# Patient Record
Sex: MALE | Race: WHITE | NOT HISPANIC OR LATINO
[De-identification: names, ages, dates, MRNs, and addresses within clinical notes are randomized per-mention and may not be internally consistent; named-entity substitution may affect disease eponyms.]

---

## 2018-07-27 PROBLEM — Z00.00 ENCOUNTER FOR PREVENTIVE HEALTH EXAMINATION: Status: ACTIVE | Noted: 2018-07-27

## 2018-08-09 ENCOUNTER — APPOINTMENT (OUTPATIENT)
Dept: VASCULAR SURGERY | Facility: CLINIC | Age: 73
End: 2018-08-09

## 2019-07-18 ENCOUNTER — INPATIENT (INPATIENT)
Facility: HOSPITAL | Age: 74
LOS: 4 days | Discharge: HOME | End: 2019-07-23
Attending: INTERNAL MEDICINE | Admitting: INTERNAL MEDICINE
Payer: MEDICARE

## 2019-07-18 VITALS
DIASTOLIC BLOOD PRESSURE: 75 MMHG | HEART RATE: 114 BPM | TEMPERATURE: 96 F | WEIGHT: 210.1 LBS | RESPIRATION RATE: 20 BRPM | OXYGEN SATURATION: 97 % | SYSTOLIC BLOOD PRESSURE: 152 MMHG

## 2019-07-18 LAB
ALBUMIN SERPL ELPH-MCNC: 3.9 G/DL — SIGNIFICANT CHANGE UP (ref 3.5–5.2)
ALP SERPL-CCNC: 83 U/L — SIGNIFICANT CHANGE UP (ref 30–115)
ALT FLD-CCNC: 12 U/L — SIGNIFICANT CHANGE UP (ref 0–41)
ANION GAP SERPL CALC-SCNC: 15 MMOL/L — HIGH (ref 7–14)
AST SERPL-CCNC: 28 U/L — SIGNIFICANT CHANGE UP (ref 0–41)
BASOPHILS # BLD AUTO: 0.06 K/UL — SIGNIFICANT CHANGE UP (ref 0–0.2)
BASOPHILS NFR BLD AUTO: 0.5 % — SIGNIFICANT CHANGE UP (ref 0–1)
BILIRUB SERPL-MCNC: 0.9 MG/DL — SIGNIFICANT CHANGE UP (ref 0.2–1.2)
BUN SERPL-MCNC: 15 MG/DL — SIGNIFICANT CHANGE UP (ref 10–20)
CALCIUM SERPL-MCNC: 8.8 MG/DL — SIGNIFICANT CHANGE UP (ref 8.5–10.1)
CHLORIDE SERPL-SCNC: 104 MMOL/L — SIGNIFICANT CHANGE UP (ref 98–110)
CO2 SERPL-SCNC: 21 MMOL/L — SIGNIFICANT CHANGE UP (ref 17–32)
CREAT SERPL-MCNC: 1 MG/DL — SIGNIFICANT CHANGE UP (ref 0.7–1.5)
EOSINOPHIL # BLD AUTO: 0.37 K/UL — SIGNIFICANT CHANGE UP (ref 0–0.7)
EOSINOPHIL NFR BLD AUTO: 3.2 % — SIGNIFICANT CHANGE UP (ref 0–8)
GLUCOSE SERPL-MCNC: 145 MG/DL — HIGH (ref 70–99)
HCT VFR BLD CALC: 42.8 % — SIGNIFICANT CHANGE UP (ref 42–52)
HGB BLD-MCNC: 15.2 G/DL — SIGNIFICANT CHANGE UP (ref 14–18)
IMM GRANULOCYTES NFR BLD AUTO: 0.3 % — SIGNIFICANT CHANGE UP (ref 0.1–0.3)
INR BLD: 1.22 RATIO — SIGNIFICANT CHANGE UP (ref 0.65–1.3)
LYMPHOCYTES # BLD AUTO: 4.6 K/UL — HIGH (ref 1.2–3.4)
LYMPHOCYTES # BLD AUTO: 40.1 % — SIGNIFICANT CHANGE UP (ref 20.5–51.1)
MCHC RBC-ENTMCNC: 33.3 PG — HIGH (ref 27–31)
MCHC RBC-ENTMCNC: 35.5 G/DL — SIGNIFICANT CHANGE UP (ref 32–37)
MCV RBC AUTO: 93.7 FL — SIGNIFICANT CHANGE UP (ref 80–94)
MONOCYTES # BLD AUTO: 1.02 K/UL — HIGH (ref 0.1–0.6)
MONOCYTES NFR BLD AUTO: 8.9 % — SIGNIFICANT CHANGE UP (ref 1.7–9.3)
NEUTROPHILS # BLD AUTO: 5.4 K/UL — SIGNIFICANT CHANGE UP (ref 1.4–6.5)
NEUTROPHILS NFR BLD AUTO: 47 % — SIGNIFICANT CHANGE UP (ref 42.2–75.2)
NRBC # BLD: 0 /100 WBCS — SIGNIFICANT CHANGE UP (ref 0–0)
PLATELET # BLD AUTO: 248 K/UL — SIGNIFICANT CHANGE UP (ref 130–400)
POTASSIUM SERPL-MCNC: 4.1 MMOL/L — SIGNIFICANT CHANGE UP (ref 3.5–5)
POTASSIUM SERPL-SCNC: 4.1 MMOL/L — SIGNIFICANT CHANGE UP (ref 3.5–5)
PROT SERPL-MCNC: 7 G/DL — SIGNIFICANT CHANGE UP (ref 6–8)
PROTHROM AB SERPL-ACNC: 14 SEC — HIGH (ref 9.95–12.87)
RBC # BLD: 4.57 M/UL — LOW (ref 4.7–6.1)
RBC # FLD: 12.6 % — SIGNIFICANT CHANGE UP (ref 11.5–14.5)
SODIUM SERPL-SCNC: 140 MMOL/L — SIGNIFICANT CHANGE UP (ref 135–146)
TROPONIN T SERPL-MCNC: <0.01 NG/ML — SIGNIFICANT CHANGE UP
WBC # BLD: 11.48 K/UL — HIGH (ref 4.8–10.8)
WBC # FLD AUTO: 11.48 K/UL — HIGH (ref 4.8–10.8)

## 2019-07-18 PROCEDURE — 99222 1ST HOSP IP/OBS MODERATE 55: CPT

## 2019-07-18 PROCEDURE — 70498 CT ANGIOGRAPHY NECK: CPT | Mod: 26

## 2019-07-18 PROCEDURE — 70450 CT HEAD/BRAIN W/O DYE: CPT | Mod: 26,59

## 2019-07-18 PROCEDURE — 71045 X-RAY EXAM CHEST 1 VIEW: CPT | Mod: 26

## 2019-07-18 PROCEDURE — 99291 CRITICAL CARE FIRST HOUR: CPT

## 2019-07-18 PROCEDURE — 70496 CT ANGIOGRAPHY HEAD: CPT | Mod: 26

## 2019-07-18 RX ORDER — DILTIAZEM HCL 120 MG
10 CAPSULE, EXT RELEASE 24 HR ORAL ONCE
Refills: 0 | Status: COMPLETED | OUTPATIENT
Start: 2019-07-18 | End: 2019-07-18

## 2019-07-18 RX ORDER — SODIUM CHLORIDE 9 MG/ML
1000 INJECTION, SOLUTION INTRAVENOUS
Refills: 0 | Status: DISCONTINUED | OUTPATIENT
Start: 2019-07-18 | End: 2019-07-21

## 2019-07-18 RX ORDER — PANTOPRAZOLE SODIUM 20 MG/1
40 TABLET, DELAYED RELEASE ORAL
Refills: 0 | Status: DISCONTINUED | OUTPATIENT
Start: 2019-07-18 | End: 2019-07-23

## 2019-07-18 RX ORDER — CHLORHEXIDINE GLUCONATE 213 G/1000ML
1 SOLUTION TOPICAL
Refills: 0 | Status: DISCONTINUED | OUTPATIENT
Start: 2019-07-18 | End: 2019-07-23

## 2019-07-18 RX ORDER — SODIUM CHLORIDE 9 MG/ML
1000 INJECTION, SOLUTION INTRAVENOUS ONCE
Refills: 0 | Status: COMPLETED | OUTPATIENT
Start: 2019-07-18 | End: 2019-07-18

## 2019-07-18 RX ORDER — SODIUM CHLORIDE 9 MG/ML
1000 INJECTION INTRAMUSCULAR; INTRAVENOUS; SUBCUTANEOUS ONCE
Refills: 0 | Status: COMPLETED | OUTPATIENT
Start: 2019-07-18 | End: 2019-07-18

## 2019-07-18 RX ORDER — ENOXAPARIN SODIUM 100 MG/ML
40 INJECTION SUBCUTANEOUS DAILY
Refills: 0 | Status: DISCONTINUED | OUTPATIENT
Start: 2019-07-18 | End: 2019-07-19

## 2019-07-18 RX ORDER — METOPROLOL TARTRATE 50 MG
50 TABLET ORAL DAILY
Refills: 0 | Status: DISCONTINUED | OUTPATIENT
Start: 2019-07-18 | End: 2019-07-23

## 2019-07-18 RX ORDER — ASPIRIN/CALCIUM CARB/MAGNESIUM 324 MG
81 TABLET ORAL DAILY
Refills: 0 | Status: DISCONTINUED | OUTPATIENT
Start: 2019-07-18 | End: 2019-07-23

## 2019-07-18 RX ORDER — DILTIAZEM HCL 120 MG
5 CAPSULE, EXT RELEASE 24 HR ORAL
Qty: 125 | Refills: 0 | Status: DISCONTINUED | OUTPATIENT
Start: 2019-07-18 | End: 2019-07-19

## 2019-07-18 RX ORDER — ATORVASTATIN CALCIUM 80 MG/1
80 TABLET, FILM COATED ORAL AT BEDTIME
Refills: 0 | Status: DISCONTINUED | OUTPATIENT
Start: 2019-07-18 | End: 2019-07-23

## 2019-07-18 RX ORDER — METOPROLOL TARTRATE 50 MG
5 TABLET ORAL ONCE
Refills: 0 | Status: COMPLETED | OUTPATIENT
Start: 2019-07-18 | End: 2019-07-18

## 2019-07-18 RX ADMIN — SODIUM CHLORIDE 1000 MILLILITER(S): 9 INJECTION, SOLUTION INTRAVENOUS at 18:30

## 2019-07-18 RX ADMIN — Medication 50 MILLIGRAM(S): at 19:18

## 2019-07-18 RX ADMIN — Medication 5 MILLIGRAM(S): at 20:54

## 2019-07-18 RX ADMIN — Medication 10 MILLIGRAM(S): at 16:30

## 2019-07-18 RX ADMIN — SODIUM CHLORIDE 1000 MILLILITER(S): 9 INJECTION INTRAMUSCULAR; INTRAVENOUS; SUBCUTANEOUS at 16:30

## 2019-07-18 RX ADMIN — SODIUM CHLORIDE 1000 MILLILITER(S): 9 INJECTION, SOLUTION INTRAVENOUS at 16:55

## 2019-07-18 RX ADMIN — SODIUM CHLORIDE 33.33 MILLILITER(S): 9 INJECTION INTRAMUSCULAR; INTRAVENOUS; SUBCUTANEOUS at 23:02

## 2019-07-18 RX ADMIN — SODIUM CHLORIDE 1000 MILLILITER(S): 9 INJECTION, SOLUTION INTRAVENOUS at 19:18

## 2019-07-18 RX ADMIN — ENOXAPARIN SODIUM 40 MILLIGRAM(S): 100 INJECTION SUBCUTANEOUS at 23:12

## 2019-07-18 RX ADMIN — Medication 5 MG/HR: at 23:58

## 2019-07-18 RX ADMIN — ATORVASTATIN CALCIUM 80 MILLIGRAM(S): 80 TABLET, FILM COATED ORAL at 23:10

## 2019-07-18 RX ADMIN — SODIUM CHLORIDE 1000 MILLILITER(S): 9 INJECTION, SOLUTION INTRAVENOUS at 16:00

## 2019-07-18 RX ADMIN — Medication 81 MILLIGRAM(S): at 23:12

## 2019-07-18 RX ADMIN — Medication 10 MILLIGRAM(S): at 16:00

## 2019-07-18 RX ADMIN — SODIUM CHLORIDE 75 MILLILITER(S): 9 INJECTION, SOLUTION INTRAVENOUS at 20:45

## 2019-07-18 NOTE — H&P ADULT - ATTENDING COMMENTS
72yo male presented to ER apparently with weakness but was found to have rapid atrial fibrillation. Was admitted to our CCU (closed unit at night) for further evaluation and management

## 2019-07-18 NOTE — ED PROVIDER NOTE - CLINICAL SUMMARY MEDICAL DECISION MAKING FREE TEXT BOX
Pt with sudden onset dizziness, was slurring words per family.  Pt without focal deficit, generalized weakness, so stroke code activated.  Pt however found with rapid HR, irregular. Case d/w Neuro, Not tpa candidate  Treated with Cardizem with HR 80s to 120s, however had drop in BP.  IVFs started.  ICU consulted and patient admitted

## 2019-07-18 NOTE — H&P ADULT - ASSESSMENT
A/P     1. weakness / ? slurred speech   - ct head negative for cva  - neuro fu   - neuro checks q 4 hrs   - 2 d echo   - trops   - bnp   - cardiology eval in am   - pan cx   - tsh   - dvt ppx

## 2019-07-18 NOTE — ED PROVIDER NOTE - ATTENDING CONTRIBUTION TO CARE
74 yo M PMHx HTN on Metoprolol, h/o "stenosis", superficial blood clot to LE 1 yr ago presents via EMS with family from home with c/o dizziness that began suddenly at 2 30 pm.  Pt was in usual state of health prior to that.  Daughter found pt to be unsteady and thought that his speech was slurred.  Pt c/o dry mouth, feels SOB.  On exam pt AAO x 3, speech is clear, but slow and soft, PERRL, + nystagmus, face symmetrical. Ext atraumatic, no edema, no drift, generalized weakness, + mur, irregular, Lungs CAT B/L no wrr, no carotid bruits,

## 2019-07-18 NOTE — ED PROVIDER NOTE - PHYSICAL EXAMINATION
GEN: Alert & Oriented x 3, No acute distress. Calm, appropriate.  Head and Neck: Normocephalic, atraumatic.   ENT: Oral mucosa pink, dry without lesions.   Eyes: PERRL. EOMI. horizontal nystagmus. No conjunctival injection. No scleral icterus.   RESP: Lungs clear to auscult bilat. no wheezes, rhonchi or rales. No retractions. Equal air entry.  CARDIO: irregularly irregular rate and rhythm, no murmurs, rubs or gallops. Normal S1, S2. Radial pulses 2+ bilaterally. No lower extremity edema.  ABD: Soft, Nondistended.  No rebound tenderness/guarding. No pulsatile mass. No tenderness with palpation x 4 quadrants.   MS: Full ROM of extremities.   SKIN: no rashes/lesions, no petechiae, no ecchymosis.  Neuro: A&O x3, CN II- XII intact, strength 5/5 throughout extremities, sensation intact. Speech & mentation intact. No drooping of eye or mouth. Without dysarthria or aphasia. Finger to nose intact. Romberg Neg. Neg. nuchal rigidity. nih=0.

## 2019-07-18 NOTE — ED PROVIDER NOTE - CRITICAL CARE PROVIDED
interpretation of diagnostic studies/direct patient care (not related to procedure)/consultation with other physicians

## 2019-07-18 NOTE — H&P ADULT - HISTORY OF PRESENT ILLNESS
73 year old man with HTN was usual state this am , when he felt week , sluggish with ? slurred speech. Pt was given cardizem, and now hypotension.

## 2019-07-18 NOTE — ED PROVIDER NOTE - NS ED ROS FT
GEN: (-) fever, (-) chills, (-) malaise  HEENT: (-) vision changes, (-) HA, (-) sore throat, (-) ear pain, (-) tinnitus   CV: (-) chest pain, (-) palpitations, (+) SOB  PULM: (-) cough, (-) wheezing, (-) dyspnea  GI: (-) abdominal pain,(-) Nausea, (-) Vomiting, (-) Diarrhea, (-) Melena  NEURO: (+) Dizziness, (+) weakness, (-) paresthesias, (-) syncope  : (-) dysuria, (-) frequency, (-) urgency  MS: (-) back pain, (-) joint pain, (-)myalgias, (-) swelling  SKIN: (-) rashes, (-) new lesions  HEME: (-) bleeding, (-) ecchymosis

## 2019-07-18 NOTE — H&P ADULT - NSHPPHYSICALEXAM_GEN_ALL_CORE
PE vss  general: awake  chest: cta b/l  cvs: s1s2+, + murmur   abd: soft, nt, nd, bs+  ext: no edema

## 2019-07-18 NOTE — ED ADULT NURSE NOTE - CHPI ED NUR SYMPTOMS NEG
no back pain/no chest pain/no shortness of breath/no chills/no vomiting/no syncope/no diaphoresis/no nausea/no congestion/no fever

## 2019-07-18 NOTE — ED PROVIDER NOTE - OBJECTIVE STATEMENT
The pt is a 73y Male with PMH HTN is presenting to ED with dizziness x 1 day. Pt sta The pt is a 73y Male with PMH HTN is presenting to ED with dizziness x 1 day. Pt states he got acutely dizzy while sitting in his recliner around 230 this afternoon. associated with feeling off balance, weak and short of breath and dry mouth. Pt states he called his daughter and his daughter states she thought he was slurring his words a bit. It has since resolved. Pt denies f/c/n/v/d, decreased urinary output, dysuria, urinary urgency/frequency, cp, palpitations, syncope, cough, abd pain.

## 2019-07-18 NOTE — PATIENT PROFILE ADULT - STATED REASON FOR ADMISSION
pt states he was sitting and watching TV, and when he stood up, he got very dizzy, palpitations pt states he was sitting and watching TV, and when he stood up, he got very dizzy & had palpitations

## 2019-07-19 LAB
ALBUMIN SERPL ELPH-MCNC: 2.9 G/DL — LOW (ref 3.5–5.2)
ALP SERPL-CCNC: 65 U/L — SIGNIFICANT CHANGE UP (ref 30–115)
ALT FLD-CCNC: 9 U/L — SIGNIFICANT CHANGE UP (ref 0–41)
ANION GAP SERPL CALC-SCNC: 10 MMOL/L — SIGNIFICANT CHANGE UP (ref 7–14)
APPEARANCE UR: CLEAR — SIGNIFICANT CHANGE UP
AST SERPL-CCNC: 20 U/L — SIGNIFICANT CHANGE UP (ref 0–41)
BILIRUB SERPL-MCNC: 0.5 MG/DL — SIGNIFICANT CHANGE UP (ref 0.2–1.2)
BILIRUB UR-MCNC: NEGATIVE — SIGNIFICANT CHANGE UP
BUN SERPL-MCNC: 14 MG/DL — SIGNIFICANT CHANGE UP (ref 10–20)
CALCIUM SERPL-MCNC: 7.8 MG/DL — LOW (ref 8.5–10.1)
CHLORIDE SERPL-SCNC: 107 MMOL/L — SIGNIFICANT CHANGE UP (ref 98–110)
CK MB CFR SERPL CALC: 11.8 NG/ML — HIGH (ref 0.6–6.3)
CK MB CFR SERPL CALC: 9.4 NG/ML — HIGH (ref 0.6–6.3)
CK SERPL-CCNC: 110 U/L — SIGNIFICANT CHANGE UP (ref 0–225)
CK SERPL-CCNC: 134 U/L — SIGNIFICANT CHANGE UP (ref 0–225)
CO2 SERPL-SCNC: 22 MMOL/L — SIGNIFICANT CHANGE UP (ref 17–32)
COLOR SPEC: YELLOW — SIGNIFICANT CHANGE UP
CREAT SERPL-MCNC: 0.8 MG/DL — SIGNIFICANT CHANGE UP (ref 0.7–1.5)
DIFF PNL FLD: NEGATIVE — SIGNIFICANT CHANGE UP
ESTIMATED AVERAGE GLUCOSE: 97 MG/DL — SIGNIFICANT CHANGE UP (ref 68–114)
GLUCOSE BLDC GLUCOMTR-MCNC: 96 MG/DL — SIGNIFICANT CHANGE UP (ref 70–99)
GLUCOSE SERPL-MCNC: 103 MG/DL — HIGH (ref 70–99)
GLUCOSE UR QL: NEGATIVE MG/DL — SIGNIFICANT CHANGE UP
HBA1C BLD-MCNC: 5 % — SIGNIFICANT CHANGE UP (ref 4–5.6)
HCT VFR BLD CALC: 35.2 % — LOW (ref 42–52)
HGB BLD-MCNC: 12.4 G/DL — LOW (ref 14–18)
KETONES UR-MCNC: NEGATIVE — SIGNIFICANT CHANGE UP
LEUKOCYTE ESTERASE UR-ACNC: NEGATIVE — SIGNIFICANT CHANGE UP
MCHC RBC-ENTMCNC: 33.9 PG — HIGH (ref 27–31)
MCHC RBC-ENTMCNC: 35.2 G/DL — SIGNIFICANT CHANGE UP (ref 32–37)
MCV RBC AUTO: 96.2 FL — HIGH (ref 80–94)
NITRITE UR-MCNC: NEGATIVE — SIGNIFICANT CHANGE UP
NRBC # BLD: 0 /100 WBCS — SIGNIFICANT CHANGE UP (ref 0–0)
NT-PROBNP SERPL-SCNC: 1619 PG/ML — HIGH (ref 0–300)
PH UR: 5.5 — SIGNIFICANT CHANGE UP (ref 5–8)
PLATELET # BLD AUTO: 178 K/UL — SIGNIFICANT CHANGE UP (ref 130–400)
POTASSIUM SERPL-MCNC: 3.7 MMOL/L — SIGNIFICANT CHANGE UP (ref 3.5–5)
POTASSIUM SERPL-SCNC: 3.7 MMOL/L — SIGNIFICANT CHANGE UP (ref 3.5–5)
PROT SERPL-MCNC: 5.4 G/DL — LOW (ref 6–8)
PROT UR-MCNC: 30 MG/DL
RBC # BLD: 3.66 M/UL — LOW (ref 4.7–6.1)
RBC # FLD: 12.9 % — SIGNIFICANT CHANGE UP (ref 11.5–14.5)
SODIUM SERPL-SCNC: 139 MMOL/L — SIGNIFICANT CHANGE UP (ref 135–146)
SP GR SPEC: 1.01 — SIGNIFICANT CHANGE UP (ref 1.01–1.03)
TROPONIN T SERPL-MCNC: 0.1 NG/ML — CRITICAL HIGH
TSH SERPL-MCNC: 0.93 UIU/ML — SIGNIFICANT CHANGE UP (ref 0.27–4.2)
UROBILINOGEN FLD QL: 0.2 MG/DL — SIGNIFICANT CHANGE UP (ref 0.2–0.2)
WBC # BLD: 9.98 K/UL — SIGNIFICANT CHANGE UP (ref 4.8–10.8)
WBC # FLD AUTO: 9.98 K/UL — SIGNIFICANT CHANGE UP (ref 4.8–10.8)

## 2019-07-19 PROCEDURE — 71045 X-RAY EXAM CHEST 1 VIEW: CPT | Mod: 26

## 2019-07-19 PROCEDURE — 99222 1ST HOSP IP/OBS MODERATE 55: CPT

## 2019-07-19 PROCEDURE — 99233 SBSQ HOSP IP/OBS HIGH 50: CPT

## 2019-07-19 PROCEDURE — 93880 EXTRACRANIAL BILAT STUDY: CPT | Mod: 26

## 2019-07-19 RX ORDER — RIVAROXABAN 15 MG-20MG
20 KIT ORAL
Refills: 0 | Status: DISCONTINUED | OUTPATIENT
Start: 2019-07-19 | End: 2019-07-22

## 2019-07-19 RX ORDER — AMIODARONE HYDROCHLORIDE 400 MG/1
200 TABLET ORAL
Refills: 0 | Status: DISCONTINUED | OUTPATIENT
Start: 2019-07-19 | End: 2019-07-22

## 2019-07-19 RX ORDER — ENOXAPARIN SODIUM 100 MG/ML
100 INJECTION SUBCUTANEOUS
Refills: 0 | Status: DISCONTINUED | OUTPATIENT
Start: 2019-07-19 | End: 2019-07-19

## 2019-07-19 RX ADMIN — ATORVASTATIN CALCIUM 80 MILLIGRAM(S): 80 TABLET, FILM COATED ORAL at 21:39

## 2019-07-19 RX ADMIN — AMIODARONE HYDROCHLORIDE 200 MILLIGRAM(S): 400 TABLET ORAL at 21:39

## 2019-07-19 RX ADMIN — CHLORHEXIDINE GLUCONATE 1 APPLICATION(S): 213 SOLUTION TOPICAL at 17:27

## 2019-07-19 RX ADMIN — SODIUM CHLORIDE 75 MILLILITER(S): 9 INJECTION, SOLUTION INTRAVENOUS at 07:59

## 2019-07-19 RX ADMIN — PANTOPRAZOLE SODIUM 40 MILLIGRAM(S): 20 TABLET, DELAYED RELEASE ORAL at 06:10

## 2019-07-19 RX ADMIN — AMIODARONE HYDROCHLORIDE 200 MILLIGRAM(S): 400 TABLET ORAL at 10:31

## 2019-07-19 RX ADMIN — CHLORHEXIDINE GLUCONATE 1 APPLICATION(S): 213 SOLUTION TOPICAL at 10:09

## 2019-07-19 RX ADMIN — SODIUM CHLORIDE 75 MILLILITER(S): 9 INJECTION, SOLUTION INTRAVENOUS at 20:00

## 2019-07-19 RX ADMIN — RIVAROXABAN 20 MILLIGRAM(S): KIT at 17:27

## 2019-07-19 RX ADMIN — Medication 81 MILLIGRAM(S): at 11:13

## 2019-07-19 NOTE — CONSULT NOTE ADULT - SUBJECTIVE AND OBJECTIVE BOX
CARDIOLOGY CONSULT NOTE     CHIEF COMPLAINT/REASON FOR CONSULT:    HPI:  73 year old man with HTN was usual state this am , when he felt week , sluggish with ? slurred speech. Pt was given cardizem, and now hypotension. (18 Jul 2019 18:10)      PAST MEDICAL & SURGICAL HISTORY:  No pertinent past medical history  No significant past surgical history      Cardiac Risks:   [x ]HTN, [ ] DM, [ ] Smoking, [ ] FH,  [ ] Lipids        MEDICATIONS:  MEDICATIONS  (STANDING):  aspirin  chewable 81 milliGRAM(s) Oral daily  atorvastatin 80 milliGRAM(s) Oral at bedtime  chlorhexidine 4% Liquid 1 Application(s) Topical two times a day  diltiazem Infusion 5 mG/Hr (5 mL/Hr) IV Continuous <Continuous>  enoxaparin Injectable 40 milliGRAM(s) SubCutaneous daily  lactated ringers. 1000 milliLiter(s) (75 mL/Hr) IV Continuous <Continuous>  metoprolol succinate ER 50 milliGRAM(s) Oral daily  pantoprazole    Tablet 40 milliGRAM(s) Oral before breakfast      FAMILY HISTORY:  No pertinent family history in first degree relatives      SOCIAL HISTORY:      [ ] Marital status    Allergies    No Known Allergies        	    REVIEW OF SYSTEMS:  CONSTITUTIONAL: No fever, weight loss, or fatigue  EYES: No eye pain, visual disturbances, or discharge  ENMT:  No difficulty hearing, tinnitus, vertigo; No sinus or throat pain  NECK: No pain or stiffness  RESPIRATORY: No cough, wheezing, chills or hemoptysis; No Shortness of Breath  CARDIOVASCULAR: See above  GASTROINTESTINAL: No abdominal or epigastric pain. No nausea, vomiting, or hematemesis; No diarrhea or constipation. No melena or hematochezia.  GENITOURINARY: No dysuria, frequency, hematuria, or incontinence  NEUROLOGICAL: No headaches, memory loss, loss of strength, numbness, or tremors  SKIN: No itching, burning, rashes, or lesions   	      PHYSICAL EXAM:  T(C): 36.2 (07-19-19 @ 07:15), Max: 36.6 (07-18-19 @ 18:37)  HR: 115 (07-19-19 @ 06:00) (88 - 152)  BP: 95/62 (07-19-19 @ 06:00) (74/50 - 152/75)  RR: 18 (07-19-19 @ 07:15) (11 - 21)  SpO2: 98% (07-19-19 @ 06:00) (97% - 100%)  Wt(kg): --  I&O's Summary    18 Jul 2019 07:01  -  19 Jul 2019 07:00  --------------------------------------------------------  IN: 1305 mL / OUT: 925 mL / NET: 380 mL        Appearance: Normal	  Psychiatry: A & O x 3, Mood & affect appropriate  HEENT:   Normal oral mucosa, PERRL, EOMI	  Lymphatic: No lymphadenopathy  Cardiovascular: Normal S1 S2,Irreg, No JVD, No murmurs  Respiratory: Lungs clear to auscultation	  Gastrointestinal:  Soft, Non-tender, + BS	  Skin: No rashes, No ecchymoses, No cyanosis	  Neurologic: Non-focal  Extremities: Normal range of motion, No clubbing, cyanosis or edema  Vascular: Peripheral pulses palpable 2+ bilaterally      ECG:  	not available    	  LABS:	 	    CARDIAC MARKERS:                                    12.4   9.98  )-----------( 178      ( 19 Jul 2019 05:54 )             35.2     07-18    140  |  104  |  15  ----------------------------<  145<H>  4.1   |  21  |  1.0    Ca    8.8      18 Jul 2019 16:05    TPro  7.0  /  Alb  3.9  /  TBili  0.9  /  DBili  x   /  AST  28  /  ALT  12  /  AlkPhos  83  07-18    PT/INR - ( 18 Jul 2019 17:25 )   PT: 14.00 sec;   INR: 1.22 ratio         PTT - ( 18 Jul 2019 17:25 )  PTT:29.6 sec CARDIOLOGY CONSULT NOTE     CHIEF COMPLAINT/REASON FOR CONSULT:    HPI:  73 year old man with HTN was usual state this am , when he felt week , sluggish with ? slurred speech. Pt was given cardizem, and now hypotension. (18 Jul 2019 18:10)      PAST MEDICAL & SURGICAL HISTORY:  No pertinent past medical history  No significant past surgical history      Cardiac Risks:   [x ]HTN, [ ] DM, [ ] Smoking, [ ] FH,  [ ] Lipids        MEDICATIONS:  MEDICATIONS  (STANDING):  aspirin  chewable 81 milliGRAM(s) Oral daily  atorvastatin 80 milliGRAM(s) Oral at bedtime  chlorhexidine 4% Liquid 1 Application(s) Topical two times a day  diltiazem Infusion 5 mG/Hr (5 mL/Hr) IV Continuous <Continuous>  enoxaparin Injectable 40 milliGRAM(s) SubCutaneous daily  lactated ringers. 1000 milliLiter(s) (75 mL/Hr) IV Continuous <Continuous>  metoprolol succinate ER 50 milliGRAM(s) Oral daily  pantoprazole    Tablet 40 milliGRAM(s) Oral before breakfast      FAMILY HISTORY:  No pertinent family history in first degree relatives      SOCIAL HISTORY:      [ ] Marital status    Allergies    No Known Allergies        	    REVIEW OF SYSTEMS:  CONSTITUTIONAL: No fever, weight loss, or fatigue  EYES: No eye pain, visual disturbances, or discharge  ENMT:  No difficulty hearing, tinnitus, vertigo; No sinus or throat pain  NECK: No pain or stiffness  RESPIRATORY: No cough, wheezing, chills or hemoptysis; No Shortness of Breath  CARDIOVASCULAR: See above  GASTROINTESTINAL: No abdominal or epigastric pain. No nausea, vomiting, or hematemesis; No diarrhea or constipation. No melena or hematochezia.  GENITOURINARY: No dysuria, frequency, hematuria, or incontinence  NEUROLOGICAL: No headaches, memory loss, loss of strength, numbness, or tremors  SKIN: No itching, burning, rashes, or lesions   	      PHYSICAL EXAM:  T(C): 36.2 (07-19-19 @ 07:15), Max: 36.6 (07-18-19 @ 18:37)  HR: 115 (07-19-19 @ 06:00) (88 - 152)  BP: 95/62 (07-19-19 @ 06:00) (74/50 - 152/75)  RR: 18 (07-19-19 @ 07:15) (11 - 21)  SpO2: 98% (07-19-19 @ 06:00) (97% - 100%)  Wt(kg): --  I&O's Summary    18 Jul 2019 07:01  -  19 Jul 2019 07:00  --------------------------------------------------------  IN: 1305 mL / OUT: 925 mL / NET: 380 mL        Appearance: Normal	  Psychiatry: A & O x 3, Mood & affect appropriate  HEENT:   Normal oral mucosa, PERRL, EOMI	  Lymphatic: No lymphadenopathy  Cardiovascular: Normal S1 S2,Irreg, II /VI gene lsb No JVD, No murmurs  Respiratory: Lungs clear to auscultation	  Gastrointestinal:  Soft, Non-tender, + BS	  Skin: No rashes, No ecchymoses, No cyanosis	  Neurologic: Non-focal  Extremities: Normal range of motion, No clubbing, cyanosis or edema  Vascular: Peripheral pulses palpable 2+ bilaterally      ECG:  	not available    	  LABS:	 	    CARDIAC MARKERS:                                    12.4   9.98  )-----------( 178      ( 19 Jul 2019 05:54 )             35.2     07-18    140  |  104  |  15  ----------------------------<  145<H>  4.1   |  21  |  1.0    Ca    8.8      18 Jul 2019 16:05    TPro  7.0  /  Alb  3.9  /  TBili  0.9  /  DBili  x   /  AST  28  /  ALT  12  /  AlkPhos  83  07-18    PT/INR - ( 18 Jul 2019 17:25 )   PT: 14.00 sec;   INR: 1.22 ratio         PTT - ( 18 Jul 2019 17:25 )  PTT:29.6 sec

## 2019-07-19 NOTE — PROGRESS NOTE ADULT - SUBJECTIVE AND OBJECTIVE BOX
24H events:    Patient feeling better but still weak and fatigued    PAST MEDICAL & SURGICAL HISTORY  No pertinent past medical history  No significant past surgical history    SOCIAL HISTORY:  Negative for smoking/alcohol/drug use.     ALLERGIES:  No Known Allergies    MEDICATIONS:  STANDING MEDICATIONS  amiodarone    Tablet 200 milliGRAM(s) Oral two times a day  aspirin  chewable 81 milliGRAM(s) Oral daily  atorvastatin 80 milliGRAM(s) Oral at bedtime  chlorhexidine 4% Liquid 1 Application(s) Topical two times a day  enoxaparin Injectable 100 milliGRAM(s) SubCutaneous two times a day  lactated ringers. 1000 milliLiter(s) IV Continuous <Continuous>  metoprolol succinate ER 50 milliGRAM(s) Oral daily  pantoprazole    Tablet 40 milliGRAM(s) Oral before breakfast    PRN MEDICATIONS    VITALS:   T(F): 97.1  HR: 76  BP: 100/68  RR: 21  SpO2: 97%    LABS:                        12.4   9.98  )-----------( 178      ( 2019 05:54 )             35.2         139  |  107  |  14  ----------------------------<  103<H>  3.7   |  22  |  0.8    Ca    7.8<L>      2019 05:54    TPro  5.4<L>  /  Alb  2.9<L>  /  TBili  0.5  /  DBili  x   /  AST  20  /  ALT  9   /  AlkPhos  65      PT/INR - ( 2019 17:25 )   PT: 14.00 sec;   INR: 1.22 ratio         PTT - ( 2019 17:25 )  PTT:29.6 sec  Urinalysis Basic - ( 2019 00:05 )    Color: Yellow / Appearance: Clear / S.015 / pH: x  Gluc: x / Ketone: Negative  / Bili: Negative / Urobili: 0.2 mg/dL   Blood: x / Protein: 30 mg/dL / Nitrite: Negative   Leuk Esterase: Negative / RBC: 1-2 /HPF / WBC 6-10 /HPF   Sq Epi: x / Non Sq Epi: Occasional /HPF / Bacteria: Few        Troponin T, Serum: 0.10 ng/mL <HH> (19 @ 05:54)  Troponin T, Serum: <0.01 ng/mL (19 @ 16:05)      CARDIAC MARKERS ( 2019 05:54 )  x     / 0.10 ng/mL / x     / x     / x      CARDIAC MARKERS ( 2019 16:05 )  x     / <0.01 ng/mL / x     / x     / x          RADIOLOGY:    PHYSICAL EXAM:  GEN: No acute distress  LUNGS: Clear to auscultation bilaterally   HEART: Irregular, tachycardic (at 8am)  ABD: Soft, non-tender, non-distended.   EXT: No edema  NEURO: AAOX3, no fND

## 2019-07-19 NOTE — CONSULT NOTE ADULT - REASON FOR ADMISSION
tachycardia , hypotension, weeness, ? slurred speech
tachycardia , hypotension, weeness, ? slurred speech

## 2019-07-19 NOTE — CONSULT NOTE ADULT - ASSESSMENT
Patient with above hx. DVT in past. He was on AC in past. Now lethargic. Not clear etiology. He may have AC. Would echo check thyroid. Neuro. AC if neuro agrees. Beta. Amiodarone for rate control. Prognosis guarded

## 2019-07-19 NOTE — PROGRESS NOTE ADULT - ASSESSMENT
73 year old man with HTN, DVT now off AC presented for generalized weakness, dysarthria? and found to be in AFib with RVR    Generalized weakness: Dehydration/orthostatic/afib vs less likely infection (WBC 11.48 initially then improved, UA neg, CXR neg, afebrile) vs doubt stroke vs other  - IVF  - Check orthostatics  - F/u cultures    New onset AFib with RVR: First trop neg, 2nd 0.1 (no CP); now converted to NSR  - Amio 200mg q12  - 2d echo performed, awaiting result   - Start AC, will likely switch to DOAC  - Repeat CE  - Likely would benefit from sleep study as outpatient  - Check TSH    HTN: now borderline hypotensive  - C/w BB, DC if BP dropping  - IVF    DVT ppx: on therapeutic lovenox  Dispo: From home

## 2019-07-19 NOTE — CONSULT NOTE ADULT - SUBJECTIVE AND OBJECTIVE BOX
Neurology Consultation note    Name  AMELIA SHAW    HPI:  73 year old man with HTN was usual state this am , when he felt week , sluggish with ? slurred speech. Pt was given cardizem, and now hypotension. (18 Jul 2019 18:10)      NEURO:  PATIENT IS A 74 YO MAN WITH HX OF HTN P/W ACUTE ONSET OF GENERALIZED WEAKNESS AND LIGHTHEADEDNESS. NO FOCAL WEAKNESS. SPEECH WAS SLOW AND EFFORT LIMITED BUT NOT DYSARTHRIC   PATIENT'S DAUGHTER REPORTS THAT HE HAD NOT BEEN DRINKING WATER FOR 2 DAYS, COFFEE ONLY. [place] DRINK[place]  PATIENT HAS NOT BEEN EATING WELL EITHER  DX W/ NEW ONSET AFIB  CTH/CTA DONE FROM ED UNREMARKABLE  PATIENT IS ASYMPTOMATIC THIS AM      Vital Signs Last 24 Hrs  T(C): 36.2 (19 Jul 2019 07:15), Max: 36.6 (18 Jul 2019 18:37)  T(F): 97.1 (19 Jul 2019 07:15), Max: 97.9 (18 Jul 2019 18:37)  HR: 118 (19 Jul 2019 07:06) (88 - 152)  BP: 100/68 (19 Jul 2019 07:06) (74/50 - 152/75)  BP(mean): 77 (19 Jul 2019 07:06) (58 - 87)  RR: 18 (19 Jul 2019 07:15) (11 - 21)  SpO2: 98% (19 Jul 2019 07:06) (97% - 100%)    Neurological Exam:   Mental status: Awake, alert and oriented x3.  Recent and remote memory intact.  Naming, repetition and comprehension intact.  Attention/concentration intact.  No dysarthria, no aphasia.  Fund of knowledge appropriate.    Cranial nerves: Pupils equally round and reactive to light, visual fields full, no nystagmus, extraocular muscles intact, V1 through V3 intact bilaterally and symmetric, face symmetric, hearing intact to finger rub, palate elevation symmetric, tongue was midline.  Motor:  MRC grading 5/5 b/l UE/LE.   strength 5/5.  Normal tone and bulk.  No abnormal movements.    Sensation: Intact to light touch, proprioception, and pinprick.   Coordination: No dysmetria on finger-to-nose and heel-to-shin.  No dysdiadokinesia.  Reflexes: 2+ in bilateral UE/LE, downgoing toes bilaterally. (-) Crowe.      Medications  amiodarone    Tablet 200 milliGRAM(s) Oral two times a day  aspirin  chewable 81 milliGRAM(s) Oral daily  atorvastatin 80 milliGRAM(s) Oral at bedtime  chlorhexidine 4% Liquid 1 Application(s) Topical two times a day  enoxaparin Injectable 100 milliGRAM(s) SubCutaneous two times a day  lactated ringers. 1000 milliLiter(s) IV Continuous <Continuous>  metoprolol succinate ER 50 milliGRAM(s) Oral daily  pantoprazole    Tablet 40 milliGRAM(s) Oral before breakfast      Lab  07-19    139  |  107  |  14  ----------------------------<  103<H>  3.7   |  22  |  0.8    Ca    7.8<L>      19 Jul 2019 05:54    TPro  5.4<L>  /  Alb  2.9<L>  /  TBili  0.5  /  DBili  x   /  AST  20  /  ALT  9   /  AlkPhos  65  07-19                          12.4   9.98  )-----------( 178      ( 19 Jul 2019 05:54 )             35.2     LIVER FUNCTIONS - ( 19 Jul 2019 05:54 )  Alb: 2.9 g/dL / Pro: 5.4 g/dL / ALK PHOS: 65 U/L / ALT: 9 U/L / AST: 20 U/L / GGT: x                   Radiology      Assessment:  74 YO MAN WITH GENERALIZED WEAKNESS AND LIGHTHEADEDNESS IN SETTING OF NEW ONSET RAPID AFIB  NON FOCAL EXAM  PRESENTATION NOT C/W CVA  CTH CTA NEG  Plan:  NO FURTHER NEURO W/U INDICATED   NO CONTRAINDICATION TO A/C  PLEASE CALL WITH ANY QUESTIONS Neurology Consultation note    Name  AMELIA SHAW    HPI:  73 year old man with HTN was usual state this am , when he felt week , sluggish with ? slurred speech. Pt was given cardizem, and now hypotension. (18 Jul 2019 18:10)      NEURO:  PATIENT IS A 72 YO MAN WITH HX OF HTN P/W ACUTE ONSET OF GENERALIZED WEAKNESS AND LIGHTHEADEDNESS. NO FOCAL WEAKNESS. SPEECH WAS SLOW AND EFFORT LIMITED BUT NOT DYSARTHRIC   PATIENT'S DAUGHTER REPORTS THAT HE HAD NOT BEEN DRINKING WATER FOR 2 DAYS, COFFEE [place]. [place] AND NOT DRINKING  PATIENT HAS NOT BEEN EATING WELL EITHER  DX W/ NEW ONSET AFIB  CTH/CTA DONE FROM ED UNREMARKABLE  PATIENT IS ASYMPTOMATIC THIS AM      Vital Signs Last 24 Hrs  T(C): 36.2 (19 Jul 2019 07:15), Max: 36.6 (18 Jul 2019 18:37)  T(F): 97.1 (19 Jul 2019 07:15), Max: 97.9 (18 Jul 2019 18:37)  HR: 118 (19 Jul 2019 07:06) (88 - 152)  BP: 100/68 (19 Jul 2019 07:06) (74/50 - 152/75)  BP(mean): 77 (19 Jul 2019 07:06) (58 - 87)  RR: 18 (19 Jul 2019 07:15) (11 - 21)  SpO2: 98% (19 Jul 2019 07:06) (97% - 100%)    Neurological Exam:   Mental status: Awake, alert and oriented x3.  Recent and remote memory intact.  Naming, repetition and comprehension intact.  Attention/concentration intact.  No dysarthria, no aphasia.  Fund of knowledge appropriate.    Cranial nerves: Pupils equally round and reactive to light, visual fields full, no nystagmus, extraocular muscles intact, V1 through V3 intact bilaterally and symmetric, face symmetric, hearing intact to finger rub, palate elevation symmetric, tongue was midline.  Motor:  MRC grading 5/5 b/l UE/LE.   strength 5/5.  Normal tone and bulk.  No abnormal movements.    Sensation: Intact to light touch, proprioception, and pinprick.   Coordination: No dysmetria on finger-to-nose and heel-to-shin.  No dysdiadokinesia.  Reflexes: 2+ in bilateral UE/LE, downgoing toes bilaterally. (-) Crowe.      Medications  amiodarone    Tablet 200 milliGRAM(s) Oral two times a day  aspirin  chewable 81 milliGRAM(s) Oral daily  atorvastatin 80 milliGRAM(s) Oral at bedtime  chlorhexidine 4% Liquid 1 Application(s) Topical two times a day  enoxaparin Injectable 100 milliGRAM(s) SubCutaneous two times a day  lactated ringers. 1000 milliLiter(s) IV Continuous <Continuous>  metoprolol succinate ER 50 milliGRAM(s) Oral daily  pantoprazole    Tablet 40 milliGRAM(s) Oral before breakfast      Lab  07-19    139  |  107  |  14  ----------------------------<  103<H>  3.7   |  22  |  0.8    Ca    7.8<L>      19 Jul 2019 05:54    TPro  5.4<L>  /  Alb  2.9<L>  /  TBili  0.5  /  DBili  x   /  AST  20  /  ALT  9   /  AlkPhos  65  07-19                          12.4   9.98  )-----------( 178      ( 19 Jul 2019 05:54 )             35.2     LIVER FUNCTIONS - ( 19 Jul 2019 05:54 )  Alb: 2.9 g/dL / Pro: 5.4 g/dL / ALK PHOS: 65 U/L / ALT: 9 U/L / AST: 20 U/L / GGT: x                   Radiology      Assessment:  72 YO MAN WITH GENERALIZED WEAKNESS AND LIGHTHEADEDNESS IN SETTING OF NEW ONSET RAPID AFIB. STROKE CODE CALLED. NOT TPA CANDIDATE NIHSS 0  MRS 0  NON FOCAL EXAM  PRESENTATION NOT C/W CVA  CTH CTA NEG  Plan:  NO FURTHER NEURO W/U INDICATED   NO CONTRAINDICATION TO A/C  PLEASE CALL WITH ANY QUESTIONS

## 2019-07-20 LAB
ALBUMIN SERPL ELPH-MCNC: 2.9 G/DL — LOW (ref 3.5–5.2)
ALP SERPL-CCNC: 78 U/L — SIGNIFICANT CHANGE UP (ref 30–115)
ALT FLD-CCNC: 10 U/L — SIGNIFICANT CHANGE UP (ref 0–41)
ANION GAP SERPL CALC-SCNC: 9 MMOL/L — SIGNIFICANT CHANGE UP (ref 7–14)
AST SERPL-CCNC: 20 U/L — SIGNIFICANT CHANGE UP (ref 0–41)
BASOPHILS # BLD AUTO: 0.05 K/UL — SIGNIFICANT CHANGE UP (ref 0–0.2)
BASOPHILS NFR BLD AUTO: 0.6 % — SIGNIFICANT CHANGE UP (ref 0–1)
BILIRUB SERPL-MCNC: 0.6 MG/DL — SIGNIFICANT CHANGE UP (ref 0.2–1.2)
BUN SERPL-MCNC: 16 MG/DL — SIGNIFICANT CHANGE UP (ref 10–20)
CALCIUM SERPL-MCNC: 8 MG/DL — LOW (ref 8.5–10.1)
CHLORIDE SERPL-SCNC: 109 MMOL/L — SIGNIFICANT CHANGE UP (ref 98–110)
CO2 SERPL-SCNC: 23 MMOL/L — SIGNIFICANT CHANGE UP (ref 17–32)
CREAT SERPL-MCNC: 1.1 MG/DL — SIGNIFICANT CHANGE UP (ref 0.7–1.5)
EOSINOPHIL # BLD AUTO: 0.39 K/UL — SIGNIFICANT CHANGE UP (ref 0–0.7)
EOSINOPHIL NFR BLD AUTO: 4.8 % — SIGNIFICANT CHANGE UP (ref 0–8)
GLUCOSE SERPL-MCNC: 116 MG/DL — HIGH (ref 70–99)
HCT VFR BLD CALC: 38.2 % — LOW (ref 42–52)
HCV AB S/CO SERPL IA: 0.1 S/CO — SIGNIFICANT CHANGE UP (ref 0–0.99)
HCV AB SERPL-IMP: SIGNIFICANT CHANGE UP
HGB BLD-MCNC: 13 G/DL — LOW (ref 14–18)
IMM GRANULOCYTES NFR BLD AUTO: 0.4 % — HIGH (ref 0.1–0.3)
LYMPHOCYTES # BLD AUTO: 2 K/UL — SIGNIFICANT CHANGE UP (ref 1.2–3.4)
LYMPHOCYTES # BLD AUTO: 24.6 % — SIGNIFICANT CHANGE UP (ref 20.5–51.1)
MAGNESIUM SERPL-MCNC: 1.6 MG/DL — LOW (ref 1.8–2.4)
MCHC RBC-ENTMCNC: 32.7 PG — HIGH (ref 27–31)
MCHC RBC-ENTMCNC: 34 G/DL — SIGNIFICANT CHANGE UP (ref 32–37)
MCV RBC AUTO: 96.2 FL — HIGH (ref 80–94)
MONOCYTES # BLD AUTO: 0.69 K/UL — HIGH (ref 0.1–0.6)
MONOCYTES NFR BLD AUTO: 8.5 % — SIGNIFICANT CHANGE UP (ref 1.7–9.3)
NEUTROPHILS # BLD AUTO: 4.98 K/UL — SIGNIFICANT CHANGE UP (ref 1.4–6.5)
NEUTROPHILS NFR BLD AUTO: 61.1 % — SIGNIFICANT CHANGE UP (ref 42.2–75.2)
NRBC # BLD: 0 /100 WBCS — SIGNIFICANT CHANGE UP (ref 0–0)
PLATELET # BLD AUTO: 171 K/UL — SIGNIFICANT CHANGE UP (ref 130–400)
POTASSIUM SERPL-MCNC: 4 MMOL/L — SIGNIFICANT CHANGE UP (ref 3.5–5)
POTASSIUM SERPL-SCNC: 4 MMOL/L — SIGNIFICANT CHANGE UP (ref 3.5–5)
PROT SERPL-MCNC: 5.6 G/DL — LOW (ref 6–8)
RBC # BLD: 3.97 M/UL — LOW (ref 4.7–6.1)
RBC # FLD: 13.2 % — SIGNIFICANT CHANGE UP (ref 11.5–14.5)
SODIUM SERPL-SCNC: 141 MMOL/L — SIGNIFICANT CHANGE UP (ref 135–146)
TSH SERPL-MCNC: 1.36 UIU/ML — SIGNIFICANT CHANGE UP (ref 0.27–4.2)
WBC # BLD: 8.14 K/UL — SIGNIFICANT CHANGE UP (ref 4.8–10.8)
WBC # FLD AUTO: 8.14 K/UL — SIGNIFICANT CHANGE UP (ref 4.8–10.8)

## 2019-07-20 PROCEDURE — 99232 SBSQ HOSP IP/OBS MODERATE 35: CPT

## 2019-07-20 RX ORDER — MAGNESIUM OXIDE 400 MG ORAL TABLET 241.3 MG
800 TABLET ORAL ONCE
Refills: 0 | Status: COMPLETED | OUTPATIENT
Start: 2019-07-20 | End: 2019-07-20

## 2019-07-20 RX ADMIN — AMIODARONE HYDROCHLORIDE 200 MILLIGRAM(S): 400 TABLET ORAL at 05:52

## 2019-07-20 RX ADMIN — AMIODARONE HYDROCHLORIDE 200 MILLIGRAM(S): 400 TABLET ORAL at 17:11

## 2019-07-20 RX ADMIN — PANTOPRAZOLE SODIUM 40 MILLIGRAM(S): 20 TABLET, DELAYED RELEASE ORAL at 05:52

## 2019-07-20 RX ADMIN — RIVAROXABAN 20 MILLIGRAM(S): KIT at 17:11

## 2019-07-20 RX ADMIN — MAGNESIUM OXIDE 400 MG ORAL TABLET 800 MILLIGRAM(S): 241.3 TABLET ORAL at 15:13

## 2019-07-20 RX ADMIN — SODIUM CHLORIDE 75 MILLILITER(S): 9 INJECTION, SOLUTION INTRAVENOUS at 07:44

## 2019-07-20 RX ADMIN — CHLORHEXIDINE GLUCONATE 1 APPLICATION(S): 213 SOLUTION TOPICAL at 17:11

## 2019-07-20 RX ADMIN — Medication 81 MILLIGRAM(S): at 11:12

## 2019-07-20 RX ADMIN — CHLORHEXIDINE GLUCONATE 1 APPLICATION(S): 213 SOLUTION TOPICAL at 10:12

## 2019-07-20 RX ADMIN — Medication 50 MILLIGRAM(S): at 05:52

## 2019-07-20 RX ADMIN — ATORVASTATIN CALCIUM 80 MILLIGRAM(S): 80 TABLET, FILM COATED ORAL at 22:21

## 2019-07-20 NOTE — PROGRESS NOTE ADULT - SUBJECTIVE AND OBJECTIVE BOX
Patient is a 73y old  Male who presents with a chief complaint of tachycardia , hypotension, weeness, ? slurred speech (19 Jul 2019 13:23)      T(F): 97.6 (07-19-19 @ 23:00), Max: 98.2 (07-19-19 @ 19:00)  HR: 76 (07-20-19 @ 05:46)  BP: 126/63 (07-20-19 @ 05:46)  RR: 26 (07-20-19 @ 05:46)  SpO2: 97% (07-20-19 @ 05:46) (96% - 97%)    PHYSICAL EXAM:  GENERAL: NAD, well-groomed, well-developed  HEAD:  Atraumatic, Normocephalic  EYES: EOMI, PERRLA, conjunctiva and sclera clear  ENMT: No tonsillar erythema, exudates, or enlargement; Moist mucous membranes, Good dentition, No lesions  NECK: Supple, No JVD, Normal thyroid  NERVOUS SYSTEM:  Alert & Oriented X3,  Motor Strength 5/5 B/L upper and lower extremities  CHEST/LUNG: Clear to percussion bilaterally; No rales, rhonchi, wheezing, or rubs  HEART: Regular rate and rhythm; No murmurs, rubs, or gallops  ABDOMEN: Soft, Nontender, Nondistended; Bowel sounds present  EXTREMITIES:   No clubbing, cyanosis, or edema  LYMPH: No lymphadenopathy noted  SKIN: No rashes or lesions    labs  07-19    139  |  107  |  14  ----------------------------<  103<H>  3.7   |  22  |  0.8    Ca    7.8<L>      19 Jul 2019 05:54    TPro  5.4<L>  /  Alb  2.9<L>  /  TBili  0.5  /  DBili  x   /  AST  20  /  ALT  9   /  AlkPhos  65  07-19                          13.0   8.14  )-----------( 171      ( 20 Jul 2019 06:27 )             38.2       PT/INR - ( 18 Jul 2019 17:25 )   PT: 14.00 sec;   INR: 1.22 ratio         PTT - ( 18 Jul 2019 17:25 )  PTT:29.6 sec    Creatine Kinase, Serum: 110 U/L (07-19-19 @ 16:11)  Troponin T, Serum: 0.10 ng/mL <HH> (07-19-19 @ 16:11)  Creatine Kinase, Serum: 134 U/L (07-19-19 @ 12:17)  Troponin T, Serum: 0.10 ng/mL <HH> (07-19-19 @ 12:17)      amiodarone    Tablet 200 milliGRAM(s) Oral two times a day  aspirin  chewable 81 milliGRAM(s) Oral daily  atorvastatin 80 milliGRAM(s) Oral at bedtime  chlorhexidine 4% Liquid 1 Application(s) Topical two times a day  lactated ringers. 1000 milliLiter(s) IV Continuous <Continuous>  metoprolol succinate ER 50 milliGRAM(s) Oral daily  pantoprazole    Tablet 40 milliGRAM(s) Oral before breakfast  rivaroxaban 20 milliGRAM(s) Oral with dinner

## 2019-07-20 NOTE — PROGRESS NOTE ADULT - ASSESSMENT
73 year old man with HTN, DVT now off AC presented for generalized weakness, dysarthria? and found to be in AFib with RVR    Generalized weakness: Dehydration/orthostatic/afib vs less likely infection (WBC 11.48 initially then improved, UA neg, CXR neg, afebrile) vs doubt stroke vs other  - IVF  - Check orthostatics  - F/u cultures    New onset AFib with RVR: First trop neg, 2nd 0.1 (no CP); now converted to NSR  - Amio 200mg q12  - 2d echo   - Start AC, will likely switch to DOAC  - Repeat CE  - Likely would benefit from sleep study as outpatient  - Check TSH    HTN: now borderline hypotensive  - C/w BB, DC if BP dropping      DVT, GI prophylaxis    Pager No.  [phone]

## 2019-07-20 NOTE — PROGRESS NOTE ADULT - SUBJECTIVE AND OBJECTIVE BOX
Chief Complaint:  Patient is a 73y old  Male who presents with a chief complaint of tachycardia , hypotension, weeness, ? slurred speech (2019 07:24)      Interval Events:     Allergies:  No Known Allergies      Home Medications:    Hospital Medications:  amiodarone    Tablet 200 milliGRAM(s) Oral two times a day  aspirin  chewable 81 milliGRAM(s) Oral daily  atorvastatin 80 milliGRAM(s) Oral at bedtime  chlorhexidine 4% Liquid 1 Application(s) Topical two times a day  lactated ringers. 1000 milliLiter(s) IV Continuous <Continuous>  metoprolol succinate ER 50 milliGRAM(s) Oral daily  pantoprazole    Tablet 40 milliGRAM(s) Oral before breakfast  rivaroxaban 20 milliGRAM(s) Oral with dinner      PMHX/PSHX:  No pertinent past medical history  No significant past surgical history      Family history:  No pertinent family history in first degree relatives      ROS:   As mentioned below      PHYSICAL EXAM:   Vital Signs:  Vital Signs Last 24 Hrs  T(C): 36 (2019 07:02), Max: 36.8 (2019 19:00)  T(F): 96.8 (2019 07:02), Max: 98.2 (2019 19:00)  HR: 71 (2019 07:37) (71 - 81)  BP: 122/73 (2019 07:37) (98/59 - 126/63)  BP(mean): 93 (2019 07:37) (73 - 93)  RR: 14 (2019 07:37) (14 - 26)  SpO2: 98% (2019 07:37) (96% - 98%)  Daily     Daily     GENERAL:  NAD  HEENT:  NC/AT,  No Thyromegaly  CHEST:  CTA B/L  HEART:  S1, S2- No M, R, G  ABDOMEN:  Soft, NT, ND  EXTEREMITIES:  no cyanosis,clubbing or edema  SKIN:  NAD  NEURO:  Grossly Nr.    LABS:                        13.0   8.14  )-----------( 171      ( 2019 06:27 )             38.2     07-20    141  |  109  |  16  ----------------------------<  116<H>  4.0   |  23  |  1.1    Ca    8.0<L>      2019 06:27  Mg     1.6     07-20    TPro  5.6<L>  /  Alb  2.9<L>  /  TBili  0.6  /  DBili  x   /  AST  20  /  ALT  10  /  AlkPhos  78  07-20    LIVER FUNCTIONS - ( 2019 06:27 )  Alb: 2.9 g/dL / Pro: 5.6 g/dL / ALK PHOS: 78 U/L / ALT: 10 U/L / AST: 20 U/L / GGT: x           PT/INR - ( 2019 17:25 )   PT: 14.00 sec;   INR: 1.22 ratio         PTT - ( 2019 17:25 )  PTT:29.6 sec  Urinalysis Basic - ( 2019 00:05 )    Color: Yellow / Appearance: Clear / S.015 / pH: x  Gluc: x / Ketone: Negative  / Bili: Negative / Urobili: 0.2 mg/dL   Blood: x / Protein: 30 mg/dL / Nitrite: Negative   Leuk Esterase: Negative / RBC: 1-2 /HPF / WBC 6-10 /HPF   Sq Epi: x / Non Sq Epi: Occasional /HPF / Bacteria: Few          Imaging:

## 2019-07-21 LAB
ALBUMIN SERPL ELPH-MCNC: 3.1 G/DL — LOW (ref 3.5–5.2)
ALP SERPL-CCNC: 74 U/L — SIGNIFICANT CHANGE UP (ref 30–115)
ALT FLD-CCNC: 8 U/L — SIGNIFICANT CHANGE UP (ref 0–41)
ANION GAP SERPL CALC-SCNC: 9 MMOL/L — SIGNIFICANT CHANGE UP (ref 7–14)
AST SERPL-CCNC: 18 U/L — SIGNIFICANT CHANGE UP (ref 0–41)
BASOPHILS # BLD AUTO: 0.05 K/UL — SIGNIFICANT CHANGE UP (ref 0–0.2)
BASOPHILS NFR BLD AUTO: 0.6 % — SIGNIFICANT CHANGE UP (ref 0–1)
BILIRUB SERPL-MCNC: 0.7 MG/DL — SIGNIFICANT CHANGE UP (ref 0.2–1.2)
BUN SERPL-MCNC: 15 MG/DL — SIGNIFICANT CHANGE UP (ref 10–20)
CALCIUM SERPL-MCNC: 8.2 MG/DL — LOW (ref 8.5–10.1)
CHLORIDE SERPL-SCNC: 104 MMOL/L — SIGNIFICANT CHANGE UP (ref 98–110)
CO2 SERPL-SCNC: 25 MMOL/L — SIGNIFICANT CHANGE UP (ref 17–32)
CREAT SERPL-MCNC: 1 MG/DL — SIGNIFICANT CHANGE UP (ref 0.7–1.5)
EOSINOPHIL # BLD AUTO: 0.46 K/UL — SIGNIFICANT CHANGE UP (ref 0–0.7)
EOSINOPHIL NFR BLD AUTO: 5.5 % — SIGNIFICANT CHANGE UP (ref 0–8)
GLUCOSE SERPL-MCNC: 111 MG/DL — HIGH (ref 70–99)
HCT VFR BLD CALC: 38.1 % — LOW (ref 42–52)
HGB BLD-MCNC: 13.3 G/DL — LOW (ref 14–18)
IMM GRANULOCYTES NFR BLD AUTO: 0.2 % — SIGNIFICANT CHANGE UP (ref 0.1–0.3)
LYMPHOCYTES # BLD AUTO: 2.21 K/UL — SIGNIFICANT CHANGE UP (ref 1.2–3.4)
LYMPHOCYTES # BLD AUTO: 26.5 % — SIGNIFICANT CHANGE UP (ref 20.5–51.1)
MAGNESIUM SERPL-MCNC: 1.5 MG/DL — LOW (ref 1.8–2.4)
MCHC RBC-ENTMCNC: 32.9 PG — HIGH (ref 27–31)
MCHC RBC-ENTMCNC: 34.9 G/DL — SIGNIFICANT CHANGE UP (ref 32–37)
MCV RBC AUTO: 94.3 FL — HIGH (ref 80–94)
MONOCYTES # BLD AUTO: 0.74 K/UL — HIGH (ref 0.1–0.6)
MONOCYTES NFR BLD AUTO: 8.9 % — SIGNIFICANT CHANGE UP (ref 1.7–9.3)
NEUTROPHILS # BLD AUTO: 4.85 K/UL — SIGNIFICANT CHANGE UP (ref 1.4–6.5)
NEUTROPHILS NFR BLD AUTO: 58.3 % — SIGNIFICANT CHANGE UP (ref 42.2–75.2)
NRBC # BLD: 0 /100 WBCS — SIGNIFICANT CHANGE UP (ref 0–0)
PLATELET # BLD AUTO: 193 K/UL — SIGNIFICANT CHANGE UP (ref 130–400)
POTASSIUM SERPL-MCNC: 3.8 MMOL/L — SIGNIFICANT CHANGE UP (ref 3.5–5)
POTASSIUM SERPL-SCNC: 3.8 MMOL/L — SIGNIFICANT CHANGE UP (ref 3.5–5)
PROT SERPL-MCNC: 5.8 G/DL — LOW (ref 6–8)
RBC # BLD: 4.04 M/UL — LOW (ref 4.7–6.1)
RBC # FLD: 13 % — SIGNIFICANT CHANGE UP (ref 11.5–14.5)
SODIUM SERPL-SCNC: 138 MMOL/L — SIGNIFICANT CHANGE UP (ref 135–146)
WBC # BLD: 8.33 K/UL — SIGNIFICANT CHANGE UP (ref 4.8–10.8)
WBC # FLD AUTO: 8.33 K/UL — SIGNIFICANT CHANGE UP (ref 4.8–10.8)

## 2019-07-21 PROCEDURE — 99232 SBSQ HOSP IP/OBS MODERATE 35: CPT

## 2019-07-21 RX ORDER — POTASSIUM CHLORIDE 20 MEQ
40 PACKET (EA) ORAL ONCE
Refills: 0 | Status: COMPLETED | OUTPATIENT
Start: 2019-07-21 | End: 2019-07-21

## 2019-07-21 RX ORDER — MAGNESIUM SULFATE 500 MG/ML
2 VIAL (ML) INJECTION
Refills: 0 | Status: COMPLETED | OUTPATIENT
Start: 2019-07-21 | End: 2019-07-21

## 2019-07-21 RX ADMIN — Medication 40 MILLIEQUIVALENT(S): at 22:16

## 2019-07-21 RX ADMIN — SODIUM CHLORIDE 75 MILLILITER(S): 9 INJECTION, SOLUTION INTRAVENOUS at 05:34

## 2019-07-21 RX ADMIN — Medication 50 GRAM(S): at 10:02

## 2019-07-21 RX ADMIN — CHLORHEXIDINE GLUCONATE 1 APPLICATION(S): 213 SOLUTION TOPICAL at 09:06

## 2019-07-21 RX ADMIN — SODIUM CHLORIDE 75 MILLILITER(S): 9 INJECTION, SOLUTION INTRAVENOUS at 07:00

## 2019-07-21 RX ADMIN — Medication 81 MILLIGRAM(S): at 12:51

## 2019-07-21 RX ADMIN — CHLORHEXIDINE GLUCONATE 1 APPLICATION(S): 213 SOLUTION TOPICAL at 18:24

## 2019-07-21 RX ADMIN — ATORVASTATIN CALCIUM 80 MILLIGRAM(S): 80 TABLET, FILM COATED ORAL at 21:07

## 2019-07-21 RX ADMIN — AMIODARONE HYDROCHLORIDE 200 MILLIGRAM(S): 400 TABLET ORAL at 05:27

## 2019-07-21 RX ADMIN — AMIODARONE HYDROCHLORIDE 200 MILLIGRAM(S): 400 TABLET ORAL at 18:21

## 2019-07-21 RX ADMIN — Medication 50 GRAM(S): at 09:05

## 2019-07-21 RX ADMIN — PANTOPRAZOLE SODIUM 40 MILLIGRAM(S): 20 TABLET, DELAYED RELEASE ORAL at 05:28

## 2019-07-21 RX ADMIN — Medication 50 MILLIGRAM(S): at 05:28

## 2019-07-21 NOTE — PROGRESS NOTE ADULT - SUBJECTIVE AND OBJECTIVE BOX
Chief Complaint:  Patient is a 73y old  Male who presents with a chief complaint of tachycardia , hypotension, weekness, ? slurred speech (2019 06:55)      Interval Events:     Allergies:  No Known Allergies      Home Medications:    Hospital Medications:  amiodarone    Tablet 200 milliGRAM(s) Oral two times a day  aspirin  chewable 81 milliGRAM(s) Oral daily  atorvastatin 80 milliGRAM(s) Oral at bedtime  chlorhexidine 4% Liquid 1 Application(s) Topical two times a day  metoprolol succinate ER 50 milliGRAM(s) Oral daily  pantoprazole    Tablet 40 milliGRAM(s) Oral before breakfast  rivaroxaban 20 milliGRAM(s) Oral with dinner      PMHX/PSHX:  No pertinent past medical history  No significant past surgical history      Family history:  No pertinent family history in first degree relatives      ROS:   As mentioned below      PHYSICAL EXAM:   Vital Signs:  Vital Signs Last 24 Hrs  T(C): 36.1 (2019 07:10), Max: 36.4 (2019 02:00)  T(F): 97 (2019 07:10), Max: 97.6 (2019 02:00)  HR: 74 (2019 07:17) (74 - 81)  BP: 122/76 (2019 07:17) (122/75 - 123/79)  BP(mean): 93 (2019 07:17) (92 - 94)  RR: 19 (2019 07:17) (16 - 22)  SpO2: 96% (2019 07:17) (96% - 97%)  Daily     Daily Weight in k.9 (2019 07:10)    GENERAL:  NAD  HEENT:  NC/AT,  No Thyromegaly  CHEST:  CTA B/L  HEART:  S1, S2- No M, R, G  ABDOMEN:  Soft, NT, ND  EXTEREMITIES:  no cyanosis,clubbing or edema  SKIN:  NAD  NEURO:  Grossly Nr.    LABS:                        13.3   8.33  )-----------( 193      ( 2019 06:24 )             38.1     07-21    138  |  104  |  15  ----------------------------<  111<H>  3.8   |  25  |  1.0    Ca    8.2<L>      2019 06:24  Mg     1.5     07-    TPro  5.8<L>  /  Alb  3.1<L>  /  TBili  0.7  /  DBili  x   /  AST  18  /  ALT  8   /  AlkPhos  74  07-    LIVER FUNCTIONS - ( 2019 06:24 )  Alb: 3.1 g/dL / Pro: 5.8 g/dL / ALK PHOS: 74 U/L / ALT: 8 U/L / AST: 18 U/L / GGT: x                   Imaging:

## 2019-07-21 NOTE — PROGRESS NOTE ADULT - SUBJECTIVE AND OBJECTIVE BOX
Patient is a 73y old  Male who presents with a chief complaint of tachycardia , hypotension, weeness, ? slurred speech (20 Jul 2019 10:30)      T(F): 97.6 (07-21-19 @ 02:00), Max: 97.6 (07-21-19 @ 02:00)  HR: 74 (07-21-19 @ 02:00)  BP: 122/75 (07-21-19 @ 02:00)  RR: 21 (07-21-19 @ 02:00)  SpO2: 96% (07-21-19 @ 02:00) (96% - 98%)    PHYSICAL EXAM:  GENERAL: NAD, well-groomed, well-developed  HEAD:  Atraumatic, Normocephalic  EYES: EOMI, PERRLA, conjunctiva and sclera clear  ENMT: No tonsillar erythema, exudates, or enlargement; Moist mucous membranes, Good dentition, No lesions  NECK: Supple, No JVD, Normal thyroid  NERVOUS SYSTEM:  Alert & Oriented X3,  Motor Strength 5/5 B/L upper and lower extremities  CHEST/LUNG: Clear to percussion bilaterally; No rales, rhonchi, wheezing, or rubs  HEART: Regular rate and rhythm; I/VI gene LSB no rubs, or gallops  ABDOMEN: Soft, Nontender, Nondistended; Bowel sounds present  EXTREMITIES:   No clubbing, cyanosis, or edema  LYMPH: No lymphadenopathy noted  SKIN: No rashes or lesions    labs  07-20    141  |  109  |  16  ----------------------------<  116<H>  4.0   |  23  |  1.1    Ca    8.0<L>      20 Jul 2019 06:27  Mg     1.6     07-20    TPro  5.6<L>  /  Alb  2.9<L>  /  TBili  0.6  /  DBili  x   /  AST  20  /  ALT  10  /  AlkPhos  78  07-20                          13.0   8.14  )-----------( 171      ( 20 Jul 2019 06:27 )             38.2               amiodarone    Tablet 200 milliGRAM(s) Oral two times a day  aspirin  chewable 81 milliGRAM(s) Oral daily  atorvastatin 80 milliGRAM(s) Oral at bedtime  chlorhexidine 4% Liquid 1 Application(s) Topical two times a day  lactated ringers. 1000 milliLiter(s) IV Continuous <Continuous>  metoprolol succinate ER 50 milliGRAM(s) Oral daily  pantoprazole    Tablet 40 milliGRAM(s) Oral before breakfast  rivaroxaban 20 milliGRAM(s) Oral with dinner

## 2019-07-21 NOTE — PROGRESS NOTE ADULT - ASSESSMENT
73 year old man with HTN, DVT now off AC presented for generalized weakness, dysarthria? and found to be in AFib with RVR    Generalized weakness: Dehydration/orthostatic/afib vs less likely infection (WBC 11.48 initially then improved, UA neg, CXR neg, afebrile) vs doubt stroke vs other  - IVF  - Check orthostatics  - F/u cultures    New onset AFib with RVR: First trop neg, 2nd 0.1 (no CP); now converted to NSR  - Amio 200mg q12  - 2d echo   - Start AC, will likely switch to DOAC  - Repeat CE  - Likely would benefit from sleep study as outpatient  - Check TSH    HTN: now borderline hypotensive  - C/w BB, DC if BP dropping      DVT, GI prophylaxis    Pager No.  [phone]

## 2019-07-21 NOTE — PROGRESS NOTE ADULT - ASSESSMENT
Now in nsr. Enzyme positive . Mod to sever as. Will consider cardiac cath, Ambulate patient. He does not want amiodarone now, Aware  risks and benefits

## 2019-07-22 LAB
ANION GAP SERPL CALC-SCNC: 8 MMOL/L — SIGNIFICANT CHANGE UP (ref 7–14)
BASOPHILS # BLD AUTO: 0.06 K/UL — SIGNIFICANT CHANGE UP (ref 0–0.2)
BASOPHILS NFR BLD AUTO: 0.7 % — SIGNIFICANT CHANGE UP (ref 0–1)
BUN SERPL-MCNC: 15 MG/DL — SIGNIFICANT CHANGE UP (ref 10–20)
CALCIUM SERPL-MCNC: 8 MG/DL — LOW (ref 8.5–10.1)
CHLORIDE SERPL-SCNC: 104 MMOL/L — SIGNIFICANT CHANGE UP (ref 98–110)
CO2 SERPL-SCNC: 27 MMOL/L — SIGNIFICANT CHANGE UP (ref 17–32)
CREAT SERPL-MCNC: 1 MG/DL — SIGNIFICANT CHANGE UP (ref 0.7–1.5)
EOSINOPHIL # BLD AUTO: 0.56 K/UL — SIGNIFICANT CHANGE UP (ref 0–0.7)
EOSINOPHIL NFR BLD AUTO: 6.3 % — SIGNIFICANT CHANGE UP (ref 0–8)
GLUCOSE SERPL-MCNC: 98 MG/DL — SIGNIFICANT CHANGE UP (ref 70–99)
HCT VFR BLD CALC: 41.2 % — LOW (ref 42–52)
HGB BLD-MCNC: 14.3 G/DL — SIGNIFICANT CHANGE UP (ref 14–18)
IMM GRANULOCYTES NFR BLD AUTO: 0.3 % — SIGNIFICANT CHANGE UP (ref 0.1–0.3)
LYMPHOCYTES # BLD AUTO: 2.24 K/UL — SIGNIFICANT CHANGE UP (ref 1.2–3.4)
LYMPHOCYTES # BLD AUTO: 25.2 % — SIGNIFICANT CHANGE UP (ref 20.5–51.1)
MAGNESIUM SERPL-MCNC: 2 MG/DL — SIGNIFICANT CHANGE UP (ref 1.8–2.4)
MCHC RBC-ENTMCNC: 33.3 PG — HIGH (ref 27–31)
MCHC RBC-ENTMCNC: 34.7 G/DL — SIGNIFICANT CHANGE UP (ref 32–37)
MCV RBC AUTO: 96 FL — HIGH (ref 80–94)
MONOCYTES # BLD AUTO: 0.82 K/UL — HIGH (ref 0.1–0.6)
MONOCYTES NFR BLD AUTO: 9.2 % — SIGNIFICANT CHANGE UP (ref 1.7–9.3)
NEUTROPHILS # BLD AUTO: 5.19 K/UL — SIGNIFICANT CHANGE UP (ref 1.4–6.5)
NEUTROPHILS NFR BLD AUTO: 58.3 % — SIGNIFICANT CHANGE UP (ref 42.2–75.2)
NRBC # BLD: 0 /100 WBCS — SIGNIFICANT CHANGE UP (ref 0–0)
PHOSPHATE SERPL-MCNC: 3.7 MG/DL — SIGNIFICANT CHANGE UP (ref 2.1–4.9)
PLATELET # BLD AUTO: 198 K/UL — SIGNIFICANT CHANGE UP (ref 130–400)
POTASSIUM SERPL-MCNC: 4.6 MMOL/L — SIGNIFICANT CHANGE UP (ref 3.5–5)
POTASSIUM SERPL-SCNC: 4.6 MMOL/L — SIGNIFICANT CHANGE UP (ref 3.5–5)
RBC # BLD: 4.29 M/UL — LOW (ref 4.7–6.1)
RBC # FLD: 12.9 % — SIGNIFICANT CHANGE UP (ref 11.5–14.5)
SODIUM SERPL-SCNC: 139 MMOL/L — SIGNIFICANT CHANGE UP (ref 135–146)
WBC # BLD: 8.9 K/UL — SIGNIFICANT CHANGE UP (ref 4.8–10.8)
WBC # FLD AUTO: 8.9 K/UL — SIGNIFICANT CHANGE UP (ref 4.8–10.8)

## 2019-07-22 PROCEDURE — 99233 SBSQ HOSP IP/OBS HIGH 50: CPT

## 2019-07-22 RX ORDER — HEPARIN SODIUM 5000 [USP'U]/ML
5000 INJECTION INTRAVENOUS; SUBCUTANEOUS ONCE
Refills: 0 | Status: COMPLETED | OUTPATIENT
Start: 2019-07-22 | End: 2019-07-22

## 2019-07-22 RX ADMIN — AMIODARONE HYDROCHLORIDE 200 MILLIGRAM(S): 400 TABLET ORAL at 06:07

## 2019-07-22 RX ADMIN — CHLORHEXIDINE GLUCONATE 1 APPLICATION(S): 213 SOLUTION TOPICAL at 10:03

## 2019-07-22 RX ADMIN — CHLORHEXIDINE GLUCONATE 1 APPLICATION(S): 213 SOLUTION TOPICAL at 18:41

## 2019-07-22 RX ADMIN — ATORVASTATIN CALCIUM 80 MILLIGRAM(S): 80 TABLET, FILM COATED ORAL at 22:16

## 2019-07-22 RX ADMIN — Medication 50 MILLIGRAM(S): at 06:07

## 2019-07-22 RX ADMIN — Medication 81 MILLIGRAM(S): at 12:56

## 2019-07-22 RX ADMIN — HEPARIN SODIUM 5000 UNIT(S): 5000 INJECTION INTRAVENOUS; SUBCUTANEOUS at 18:38

## 2019-07-22 RX ADMIN — PANTOPRAZOLE SODIUM 40 MILLIGRAM(S): 20 TABLET, DELAYED RELEASE ORAL at 06:07

## 2019-07-22 NOTE — PROGRESS NOTE ADULT - ASSESSMENT
73 year old man with HTN, DVT now off AC presented for generalized weakness, dysarthria? and found to be in AFib with RVR    New onset AFib with RVR with troponineia: Now converted to NSR  - Cath tomorrow  - Stop amio, patient refusing, understands risk and benefis  - Hold Xarelto tonight for cath tomorrow, then resume  - Likely would benefit from sleep study as outpatient    Generalized weakness: Dehydration/orthostatic/afib vs less likely infection (WBC 11.48 initially then improved, UA neg, CXR neg, afebrile) vs doubt stroke vs other  - Keep euvolemic    HTN: BP was low, now wnl  - C/w BB    DVT ppx: Resume Xarelto post cath  Dispo: From home

## 2019-07-22 NOTE — PROGRESS NOTE ADULT - ATTENDING COMMENTS
Patient seen and evaluated independently medical resident note reviewed, I agree with plan and management, except as I have noted.
Patient seen and evaluated independently medical resident note reviewed, I agree with plan and management, except as I have noted.

## 2019-07-22 NOTE — PROGRESS NOTE ADULT - SUBJECTIVE AND OBJECTIVE BOX
Patient is a 73y old  Male who presents with a chief complaint of tachycardia , hypotension, weeness, ? slurred speech (21 Jul 2019 15:17)      T(F): 97.7 (07-21-19 @ 23:40), Max: 97.7 (07-21-19 @ 23:40)  HR: 75 (07-21-19 @ 23:40)  BP: 131/80 (07-21-19 @ 23:40)  RR: 18 (07-21-19 @ 23:40)  SpO2: 96% (07-21-19 @ 07:17) (96% - 96%)    PHYSICAL EXAM:  GENERAL: NAD, well-groomed, well-developed  HEAD:  Atraumatic, Normocephalic  EYES: EOMI, PERRLA, conjunctiva and sclera clear  ENMT: No tonsillar erythema, exudates, or enlargement; Moist mucous membranes, Good dentition, No lesions  NECK: Supple, No JVD, Normal thyroid  NERVOUS SYSTEM:  Alert & Oriented X3,  Motor Strength 5/5 B/L upper and lower extremities  CHEST/LUNG: Clear to percussion bilaterally; No rales, rhonchi, wheezing, or rubs  HEART: Regular rate and rhythm; No murmurs, rubs, or gallops  ABDOMEN: Soft, Nontender, Nondistended; Bowel sounds present  EXTREMITIES:   No clubbing, cyanosis, or edema  LYMPH: No lymphadenopathy noted  SKIN: No rashes or lesions    labs  07-21    138  |  104  |  15  ----------------------------<  111<H>  3.8   |  25  |  1.0    Ca    8.2<L>      21 Jul 2019 06:24  Mg     1.5     07-21    TPro  5.8<L>  /  Alb  3.1<L>  /  TBili  0.7  /  DBili  x   /  AST  18  /  ALT  8   /  AlkPhos  74  07-21                          13.3   8.33  )-----------( 193      ( 21 Jul 2019 06:24 )             38.1               amiodarone    Tablet 200 milliGRAM(s) Oral two times a day  aspirin  chewable 81 milliGRAM(s) Oral daily  atorvastatin 80 milliGRAM(s) Oral at bedtime  chlorhexidine 4% Liquid 1 Application(s) Topical two times a day  metoprolol succinate ER 50 milliGRAM(s) Oral daily  pantoprazole    Tablet 40 milliGRAM(s) Oral before breakfast  rivaroxaban 20 milliGRAM(s) Oral with dinner

## 2019-07-22 NOTE — PROGRESS NOTE ADULT - ASSESSMENT
Now in nsr. Enzyme positive . Mod to severe AS.   Ambulate patient. He does not want amiodarone now, Aware  risks and benefits. To consider cardiac cath

## 2019-07-22 NOTE — PROGRESS NOTE ADULT - SUBJECTIVE AND OBJECTIVE BOX
24H events:    No events, no complaints    PAST MEDICAL & SURGICAL HISTORY  No pertinent past medical history  No significant past surgical history    SOCIAL HISTORY:  Negative for smoking/alcohol/drug use.     ALLERGIES:  No Known Allergies    MEDICATIONS:  STANDING MEDICATIONS  aspirin  chewable 81 milliGRAM(s) Oral daily  atorvastatin 80 milliGRAM(s) Oral at bedtime  chlorhexidine 4% Liquid 1 Application(s) Topical two times a day  metoprolol succinate ER 50 milliGRAM(s) Oral daily  pantoprazole    Tablet 40 milliGRAM(s) Oral before breakfast    PRN MEDICATIONS    VITALS:   T(F): 95.7  HR: 66  BP: 146/83  RR: 20  SpO2: 97%    LABS:                        14.3   8.90  )-----------( 198      ( 22 Jul 2019 06:24 )             41.2     07-22    139  |  104  |  15  ----------------------------<  98  4.6   |  27  |  1.0    Ca    8.0<L>      22 Jul 2019 06:24  Phos  3.7     07-22  Mg     2.0     07-22    TPro  5.8<L>  /  Alb  3.1<L>  /  TBili  0.7  /  DBili  x   /  AST  18  /  ALT  8   /  AlkPhos  74  07-21                  RADIOLOGY:    PHYSICAL EXAM:  GEN: No acute distress  LUNGS: Clear to auscultation bilaterally   HEART: S1/S2 present. RRR.   ABD: Soft, non-tender, non-distended. Bowel sounds present  EXT: No edema  NEURO: AAOX3 24H events:    No events, no complaints    PAST MEDICAL & SURGICAL HISTORY  No pertinent past medical history  No significant past surgical history    SOCIAL HISTORY:  Negative for smoking/alcohol/drug use.     ALLERGIES:  No Known Allergies    MEDICATIONS:  STANDING MEDICATIONS  aspirin  chewable 81 milliGRAM(s) Oral daily  atorvastatin 80 milliGRAM(s) Oral at bedtime  chlorhexidine 4% Liquid 1 Application(s) Topical two times a day  metoprolol succinate ER 50 milliGRAM(s) Oral daily  pantoprazole    Tablet 40 milliGRAM(s) Oral before breakfast    PRN MEDICATIONS    VITALS:   T(F): 95.7  HR: 66  BP: 146/83  RR: 20  SpO2: 97%    LABS:                        14.3   8.90  )-----------( 198      ( 22 Jul 2019 06:24 )             41.2     07-22    139  |  104  |  15  ----------------------------<  98  4.6   |  27  |  1.0    Ca    8.0<L>      22 Jul 2019 06:24  Phos  3.7     07-22  Mg     2.0     07-22    TPro  5.8<L>  /  Alb  3.1<L>  /  TBili  0.7  /  DBili  x   /  AST  18  /  ALT  8   /  AlkPhos  74  07-21        < from: 12 Lead ECG (07.22.19 @ 06:44) >  Diagnosis Line Sinus rhythm with 1st degree A-V block  Left axis deviation      RADIOLOGY:  < from: Xray Chest 1 View- PORTABLE-Routine (07.19.19 @ 07:09) >  Impression:      No radiographic evidence of acute cardiopulmonary disease.        PHYSICAL EXAM:  GEN: No acute distress  LUNGS: Clear to auscultation bilaterally   HEART: S1/S2 present. RRR.   ABD: Soft, non-tender, non-distended. Bowel sounds present  EXT: No edema  NEURO: AAOX3

## 2019-07-23 ENCOUNTER — TRANSCRIPTION ENCOUNTER (OUTPATIENT)
Age: 74
End: 2019-07-23

## 2019-07-23 VITALS — RESPIRATION RATE: 18 BRPM | SYSTOLIC BLOOD PRESSURE: 131 MMHG | DIASTOLIC BLOOD PRESSURE: 78 MMHG | HEART RATE: 64 BPM

## 2019-07-23 LAB
ALBUMIN SERPL ELPH-MCNC: 3.5 G/DL — SIGNIFICANT CHANGE UP (ref 3.5–5.2)
ALP SERPL-CCNC: 90 U/L — SIGNIFICANT CHANGE UP (ref 30–115)
ALT FLD-CCNC: 11 U/L — SIGNIFICANT CHANGE UP (ref 0–41)
ANION GAP SERPL CALC-SCNC: 12 MMOL/L — SIGNIFICANT CHANGE UP (ref 7–14)
APTT BLD: 31.5 SEC — SIGNIFICANT CHANGE UP (ref 27–39.2)
AST SERPL-CCNC: 18 U/L — SIGNIFICANT CHANGE UP (ref 0–41)
BASOPHILS # BLD AUTO: 0.05 K/UL — SIGNIFICANT CHANGE UP (ref 0–0.2)
BASOPHILS NFR BLD AUTO: 0.6 % — SIGNIFICANT CHANGE UP (ref 0–1)
BILIRUB SERPL-MCNC: 0.7 MG/DL — SIGNIFICANT CHANGE UP (ref 0.2–1.2)
BLD GP AB SCN SERPL QL: SIGNIFICANT CHANGE UP
BUN SERPL-MCNC: 16 MG/DL — SIGNIFICANT CHANGE UP (ref 10–20)
CALCIUM SERPL-MCNC: 8.3 MG/DL — LOW (ref 8.5–10.1)
CHLORIDE SERPL-SCNC: 103 MMOL/L — SIGNIFICANT CHANGE UP (ref 98–110)
CO2 SERPL-SCNC: 23 MMOL/L — SIGNIFICANT CHANGE UP (ref 17–32)
CREAT SERPL-MCNC: 0.9 MG/DL — SIGNIFICANT CHANGE UP (ref 0.7–1.5)
EOSINOPHIL # BLD AUTO: 0.53 K/UL — SIGNIFICANT CHANGE UP (ref 0–0.7)
EOSINOPHIL NFR BLD AUTO: 6 % — SIGNIFICANT CHANGE UP (ref 0–8)
GLUCOSE SERPL-MCNC: 101 MG/DL — HIGH (ref 70–99)
HCT VFR BLD CALC: 41.6 % — LOW (ref 42–52)
HGB BLD-MCNC: 14.7 G/DL — SIGNIFICANT CHANGE UP (ref 14–18)
IMM GRANULOCYTES NFR BLD AUTO: 0.5 % — HIGH (ref 0.1–0.3)
INR BLD: 1.21 RATIO — SIGNIFICANT CHANGE UP (ref 0.65–1.3)
LYMPHOCYTES # BLD AUTO: 2.22 K/UL — SIGNIFICANT CHANGE UP (ref 1.2–3.4)
LYMPHOCYTES # BLD AUTO: 25.2 % — SIGNIFICANT CHANGE UP (ref 20.5–51.1)
MAGNESIUM SERPL-MCNC: 1.9 MG/DL — SIGNIFICANT CHANGE UP (ref 1.8–2.4)
MCHC RBC-ENTMCNC: 33.2 PG — HIGH (ref 27–31)
MCHC RBC-ENTMCNC: 35.3 G/DL — SIGNIFICANT CHANGE UP (ref 32–37)
MCV RBC AUTO: 93.9 FL — SIGNIFICANT CHANGE UP (ref 80–94)
MONOCYTES # BLD AUTO: 0.75 K/UL — HIGH (ref 0.1–0.6)
MONOCYTES NFR BLD AUTO: 8.5 % — SIGNIFICANT CHANGE UP (ref 1.7–9.3)
NEUTROPHILS # BLD AUTO: 5.21 K/UL — SIGNIFICANT CHANGE UP (ref 1.4–6.5)
NEUTROPHILS NFR BLD AUTO: 59.2 % — SIGNIFICANT CHANGE UP (ref 42.2–75.2)
NRBC # BLD: 0 /100 WBCS — SIGNIFICANT CHANGE UP (ref 0–0)
PLATELET # BLD AUTO: 218 K/UL — SIGNIFICANT CHANGE UP (ref 130–400)
POTASSIUM SERPL-MCNC: 4.4 MMOL/L — SIGNIFICANT CHANGE UP (ref 3.5–5)
POTASSIUM SERPL-SCNC: 4.4 MMOL/L — SIGNIFICANT CHANGE UP (ref 3.5–5)
PROT SERPL-MCNC: 6.5 G/DL — SIGNIFICANT CHANGE UP (ref 6–8)
PROTHROM AB SERPL-ACNC: 13.9 SEC — HIGH (ref 9.95–12.87)
RBC # BLD: 4.43 M/UL — LOW (ref 4.7–6.1)
RBC # FLD: 13 % — SIGNIFICANT CHANGE UP (ref 11.5–14.5)
SODIUM SERPL-SCNC: 138 MMOL/L — SIGNIFICANT CHANGE UP (ref 135–146)
WBC # BLD: 8.8 K/UL — SIGNIFICANT CHANGE UP (ref 4.8–10.8)
WBC # FLD AUTO: 8.8 K/UL — SIGNIFICANT CHANGE UP (ref 4.8–10.8)

## 2019-07-23 RX ORDER — AMIODARONE HYDROCHLORIDE 400 MG/1
1 TABLET ORAL
Qty: 30 | Refills: 0
Start: 2019-07-23 | End: 2019-08-21

## 2019-07-23 RX ORDER — RIVAROXABAN 15 MG-20MG
1 KIT ORAL
Qty: 30 | Refills: 2
Start: 2019-07-23

## 2019-07-23 RX ORDER — ASPIRIN/CALCIUM CARB/MAGNESIUM 324 MG
1 TABLET ORAL
Qty: 30 | Refills: 0
Start: 2019-07-23 | End: 2019-08-21

## 2019-07-23 RX ORDER — ASPIRIN/CALCIUM CARB/MAGNESIUM 324 MG
1 TABLET ORAL
Qty: 30 | Refills: 0
Start: 2019-07-23

## 2019-07-23 RX ORDER — METOPROLOL TARTRATE 50 MG
1 TABLET ORAL
Qty: 30 | Refills: 0
Start: 2019-07-23 | End: 2019-08-21

## 2019-07-23 RX ORDER — METOPROLOL TARTRATE 50 MG
0 TABLET ORAL
Qty: 0 | Refills: 0 | DISCHARGE

## 2019-07-23 RX ORDER — ATORVASTATIN CALCIUM 80 MG/1
1 TABLET, FILM COATED ORAL
Qty: 0 | Refills: 0 | DISCHARGE
Start: 2019-07-23

## 2019-07-23 RX ORDER — ATORVASTATIN CALCIUM 80 MG/1
1 TABLET, FILM COATED ORAL
Qty: 30 | Refills: 0
Start: 2019-07-23 | End: 2019-08-21

## 2019-07-23 RX ORDER — ASPIRIN/CALCIUM CARB/MAGNESIUM 324 MG
325 TABLET ORAL DAILY
Refills: 0 | Status: DISCONTINUED | OUTPATIENT
Start: 2019-07-23 | End: 2019-07-23

## 2019-07-23 RX ADMIN — Medication 50 MILLIGRAM(S): at 05:36

## 2019-07-23 RX ADMIN — PANTOPRAZOLE SODIUM 40 MILLIGRAM(S): 20 TABLET, DELAYED RELEASE ORAL at 05:36

## 2019-07-23 RX ADMIN — CHLORHEXIDINE GLUCONATE 1 APPLICATION(S): 213 SOLUTION TOPICAL at 05:35

## 2019-07-23 RX ADMIN — Medication 325 MILLIGRAM(S): at 08:11

## 2019-07-23 NOTE — DISCHARGE NOTE PROVIDER - HOSPITAL COURSE
73M with HTN, DVT now off AC presented for generalized weakness and dysarthria and found to be in AFib with RVR. Stroke code initially called, CTH negative for acute pathology, patient given pushes of cardizem and metoprolol however he became hypotensive and was given several boluses throughout the night. He was then switched to amiodarone, converted to NSR and then amiodarone was stopped due to patient refusal. Patient placed on anticoagulation. He had a troponin of 0.1 x3. He went for cath which showed normal EF with severe aortic stenosis, and he was evaluated by the TAVR team for possible replacement as outpatient. TSH was wnl. He is stable for DC. 73M with HTN, DVT now off AC presented for generalized weakness and dysarthria and found to be in AFib with RVR. Stroke code initially called, CTH negative for acute pathology, patient given pushes of cardizem and metoprolol however he became hypotensive and was given several boluses throughout the night. He was then switched to amiodarone, converted to NSR and then amiodarone was stopped due to patient refusal. Patient placed on anticoagulation. He had a troponin of 0.1 x3. He went for cath which showed normal EF with severe aortic stenosis, and he was evaluated by the TAVR team for possible replacement as outpatient. TSH was wnl. He is stable for DC.        ATTENDING    I spoke with cardiology team, they are ok with Pt being dc'd from the hospital and following up with TAVR team as an out patient. This is the pts preference as well.

## 2019-07-23 NOTE — CHART NOTE - NSCHARTNOTEFT_GEN_A_CORE
72 yo male with PMH of HTN, DVT (on Xaralto) presented to ED 7/18 with slurred speech, dizziness and weakness. Stroke Code called but CTA head and neck negative.  Also found to be in rapid a fib which converted to SR with amiodarone, which has been stopped at patients request after risks and benefits were explained to him.  TTE at [facility] demonstrated moderate to severe AS. Patient transferred to [facility] for CC which revealed no significant CAD but confirmed severe AS with an FREDY of 0.93.  Dr. Lepe spoke to Dr. Elena regarding current issues. Patient has an appointment 7/26/19 at 14:30 to be seen in Heart Valve clinic.  TTE will be repeated as operating   Surgeons and interventional cardiologists will need to visualize TTE. (south TTE unable to exported to [place] site or put onto [employer] for viewing)    LABS:                14.7   8.80  )-----------( 218      ( 23 Jul 2019 06:01 )             41.6       07-23    138  |  103  |  16  ----------------------------<  101<H>  4.4   |  23  |  0.9    Ca    8.3<L>      23 Jul 2019 06:01  Phos  3.7     07-22  Mg     1.9     07-23    TPro  6.5  /  Alb  3.5  /  TBili  0.7  /  DBili  x   /  AST  18  /  ALT  11  /  AlkPhos  90  07-23      PT/INR - ( 23 Jul 2019 06:01 )   PT: 13.90 sec;   INR: 1.21 ratio       PTT - ( 23 Jul 2019 06:01 )  PTT:31.5 sec    VA Duplex Carotid, Bilat (07.19.19 @ 09:08) >  20-39% stenosis of the right internal carotid artery.  20-39% stenosis of the left internal carotid artery.    Xray Chest 1 View- PORTABLE-Routine (07.19.19 @ 07:09) >  No radiographic evidence of acute cardiopulmonary disease.    CT Angio Head w/ IV Cont (07.18.19 @ 16:20) >  No evidence of major vascular stenosis or occlusion.    CT Brain Stroke Protocol (07.18.19 @ 16:13) >  No evidence of acute intracranial hemorrhage or large territory infarct.    12 Lead ECG (07.22.19 @ 06:44) >  Diagnosis Line Sinus rhythm with 1st degree A-V block  Left axis deviation    Magnesium, Serum: 1.9 mg/dL (07.23.19 @ 06:01)  Thyroid Stimulating Hormone, Serum in AM (07.19.19 @ 05:54)   Thyroid Stimulating Hormone, Serum: 0.93 uIU/mL    Hemoglobin A1C with Mean Plasma Glucose (07.19.19 @ 05:54)    Hemoglobin A1C, Whole Blood: 5.0: Method: Immunoassay                  Mean Plasma Glucose: 97 mg/dL    Urinalysis (07.19.19 @ 00:05)    pH Urine: 5.5    Glucose Qualitative, Urine: Negative mg/dL    Blood, Urine: Negative    Color: Yellow    Urine Appearance: Clear    Bilirubin: Negative    Ketone - Urine: Negative    Specific Gravity: 1.015    Protein, Urine: 30 mg/dL    Urobilinogen: 0.2 mg/dL    Nitrite: Negative    Leukocyte Esterase Concentration: Negative      To complete TAVR vs SAVR evaluation patient will need to see Dentist, CTA TAVR protocol, PFT and Frailty testing. 74 yo male with PMH of HTN, DVT (on Xaralto) presented to ED 7/18 with slurred speech, dizziness and weakness. Stroke Code called but CTA head and neck negative.  Also found to be in rapid a fib which converted to SR with amiodarone, which has been stopped at patients request after risks and benefits were explained to him.  TTE at [facility] demonstrated moderate to severe AS. Patient transferred to [place] for CC which revealed no significant CAD but confirmed severe AS with an FREDY of 0.93.  Dr. Lepe spoke to Dr. Elena regarding current issues. Patient has an appointment 7/26/19 at 14:30 to be seen in Heart Valve clinic.  TTE will be repeated as operating   Surgeons and interventional cardiologists will need to visualize TTE. (south TTE unable to exported to [place] site or put onto [employer] for viewing)    LABS:                14.7   8.80  )-----------( 218      ( 23 Jul 2019 06:01 )             41.6       07-23    138  |  103  |  16  ----------------------------<  101<H>  4.4   |  23  |  0.9    Ca    8.3<L>      23 Jul 2019 06:01  Phos  3.7     07-22  Mg     1.9     07-23    TPro  6.5  /  Alb  3.5  /  TBili  0.7  /  DBili  x   /  AST  18  /  ALT  11  /  AlkPhos  90  07-23      PT/INR - ( 23 Jul 2019 06:01 )   PT: 13.90 sec;   INR: 1.21 ratio      PTT - ( 23 Jul 2019 06:01 )  PTT:31.5 sec    VA Duplex Carotid, Bilat (07.19.19 @ 09:08) >  20-39% stenosis of the right internal carotid artery.  20-39% stenosis of the left internal carotid artery.    Xray Chest 1 View- PORTABLE-Routine (07.19.19 @ 07:09) >  No radiographic evidence of acute cardiopulmonary disease.    CT Angio Head w/ IV Cont (07.18.19 @ 16:20) >  No evidence of major vascular stenosis or occlusion.    CT Brain Stroke Protocol (07.18.19 @ 16:13) >  No evidence of acute intracranial hemorrhage or large territory infarct.    12 Lead ECG (07.22.19 @ 06:44) >  Diagnosis Line Sinus rhythm with 1st degree A-V block  Left axis deviation    Magnesium, Serum: 1.9 mg/dL (07.23.19 @ 06:01)  Thyroid Stimulating Hormone, Serum in AM (07.19.19 @ 05:54)   Thyroid Stimulating Hormone, Serum: 0.93 uIU/mL    Hemoglobin A1C with Mean Plasma Glucose (07.19.19 @ 05:54)  Hemoglobin A1C, Whole Blood: 5.0: Method: Immunoassay                  Mean Plasma Glucose: 97 mg/dL    Urinalysis (07.19.19 @ 00:05)    pH Urine: 5.5    Glucose Qualitative, Urine: Negative mg/dL    Blood, Urine: Negative    Color: Yellow    Urine Appearance: Clear    Bilirubin: Negative    Ketone - Urine: Negative    Specific Gravity: 1.015    Protein, Urine: 30 mg/dL    Urobilinogen: 0.2 mg/dL    Nitrite: Negative    Leukocyte Esterase Concentration: Negative      STS Isolated AVR:       Risk of Mortality:  1.252%         Renal Failure: 1.574%  Permanent Stroke:  0.634%     Prolonged Ventilation: 5.786%  DSW Infection:   0.252%     Reoperation:  2.983%  Morbidity or Mortality: 8.900%  Short Length of Stay:  41.409%    Long Length of Stay: 4.880%         To complete Low risk TAVR vs SAVR evaluation patient will need Dentist evaluation, CTA TAVR protocol, PFT and Frailty testing.

## 2019-07-23 NOTE — CHART NOTE - NSCHARTNOTEFT_GEN_A_CORE
PRE-OP DIAGNOSIS: suspected CAD, evaluation of severe AS.    PROCEDURE:  [ x] C with coronary angiography                        [x] RHC  Physician: Dr Shaw  Assistant: Yanet Felton Daneshvar    ANESTHESIA TYPE:  [  ]General Anesthesia  [x] Sedation  [x] Local/Regional    ESTIMATED BLOOD LOSS:    10   mL    CONDITION  [  ] Critical  [  ] Serious  [  ]Fair  [ x ]Good      SPECIMENS REMOVED (IF APPLICABLE):      IV CONTRAST:      75       mL      IMPLANTS (IF APPLICABLE)      FINDINGS    Left Heart Catheterization:  LVEF%: 55% by ventriculography  LVEDP: Elevated  [ ] Normal Coronary Arteries  [x] Luminal Irregularities  [ ] Non-obstructive CAD    ACCESS:    [ ] right radial artery  [x] right femoral artery  [x] right femoral vein    LEFT HEART CATHETERIZATION:                                    Hemodynamics: Hemodynamic assessment demonstrates moderately to severely elevated LVEDP, normal cardiac output, and mildly to moderately elevated pulmonary capillary wedge pressure.   Ventricles: Global left ventricular function was normal. EF estimated was 55 %.     Valves: Aortic valve: The aortic valve was evaluated by hemodynamic measurement. There was severe aortic stenosis. FREDY=0.93 cm2 using Hakki Formula.     Coronary circulation: The coronary circulation is co-dominant. There was no angiographic evidence for occlusive coronary artery disease. Left main: The vessel was small to medium sized. Angiography showed no evidence of disease. LAD: The vessel was medium to large sized and mildly tortuous. Angiography showed the vessel to wrap around the cardiac apex and minor luminal irregularities with no flow limiting lesions. There were two major diagonal branches. Circumflex: The vessel was medium to large sized. Angiography showed minor luminal irregularities with no flow limiting lesions. There were two major obtuse marginals. RCA: The vessel was medium sized. Angiography showed mild atherosclerosis with no flow limiting lesions.     RIGHT HEART CATHETERIZATION:    PA:30/15/22  PCWP: 15   CO/CI: (by thermodilution) 4.8/2.15      POST-OP DIAGNOSIS    No significant CAD  Severe AS      PLAN OF CARE  [ ] D/C Home today  [ ]  D/C in AM  [x] Return to In-patient bed, needs Heart Team evaluation for aortic valve replacement.  [ ] Admit for observation  [ ] Return for staged procedure:  [ ] CT Surgery consult called  [ ] Continue DAPT, B-blocker & Statin therapy PRE-OP DIAGNOSIS: suspected CAD, evaluation of severe AS.    PROCEDURE:  [ x] C with coronary angiography                        [x] RHC  Physician: Dr Shaw  Assistant: Yanet Felton Daneshvar    ANESTHESIA TYPE:  [  ]General Anesthesia  [x] Sedation  [x] Local/Regional    ESTIMATED BLOOD LOSS:    10   mL    CONDITION  [  ] Critical  [  ] Serious  [  ]Fair  [ x ]Good      SPECIMENS REMOVED (IF APPLICABLE):      IV CONTRAST:      75       mL      IMPLANTS (IF APPLICABLE)      FINDINGS    Left Heart Catheterization:  LVEF%: 55% by ventriculography  LVEDP: Elevated  [ ] Normal Coronary Arteries  [x] Luminal Irregularities  [ ] Non-obstructive CAD    ACCESS:    [ ] right radial artery  [x] right femoral artery  [x] right femoral vein    LEFT HEART CATHETERIZATION:                                    Hemodynamics: Hemodynamic assessment demonstrates moderately to severely elevated LVEDP, normal cardiac output, and mildly to moderately elevated pulmonary capillary wedge pressure.   Ventricles: Global left ventricular function was normal. EF estimated was 55 %.     Valves: Aortic valve: The aortic valve was evaluated by hemodynamic measurement. There was severe aortic stenosis. FREDY=0.93 cm2 using Hakki Formula.     Coronary circulation: The coronary circulation is co-dominant. There was no angiographic evidence for occlusive coronary artery disease. Left main: The vessel was small to medium sized. Angiography showed no evidence of disease. LAD: The vessel was medium to large sized and mildly tortuous. Angiography showed the vessel to wrap around the cardiac apex and minor luminal irregularities with no flow limiting lesions. There were two major diagonal branches. Circumflex: The vessel was medium to large sized. Angiography showed minor luminal irregularities with no flow limiting lesions. There were two major obtuse marginals. RCA: The vessel was medium sized. Angiography showed mild atherosclerosis with no flow limiting lesions.     RIGHT HEART CATHETERIZATION:    PA:30/15/22  PCWP: 15   CO/CI: (by thermodilution) 4.8/2.15      POST-OP DIAGNOSIS    No significant CAD  Severe AS      PLAN OF CARE  [ ] D/C Home today  [ ]  D/C in AM  [x] Return to In-patient bed, needs Heart Team evaluation for aortic valve replacement.  [ ] Admit for observation  [ ] Return for staged procedure:  [ ] CT Surgery consult called  [ ] Continue DAPT, B-blocker & Statin therapy    agree

## 2019-07-23 NOTE — DISCHARGE NOTE PROVIDER - CARE PROVIDERS DIRECT ADDRESSES
,DirectAddress_[email or web address] the [email or web address]

## 2019-07-23 NOTE — DISCHARGE NOTE PROVIDER - NSDCCPCAREPLAN_GEN_ALL_CORE_FT
PRINCIPAL DISCHARGE DIAGNOSIS  Diagnosis: A-fib  Assessment and Plan of Treatment: Continue Xarelto daily  Followup with your cardiologist within 1-2 weeks.      SECONDARY DISCHARGE DIAGNOSES  Diagnosis: Aortic stenosis  Assessment and Plan of Treatment: Please follow up with Dr. Jasmine to discuss aortic valve replacement (TAVR)

## 2019-07-23 NOTE — PROGRESS NOTE ADULT - ASSESSMENT
73M with HTN, DVT now off AC presented for generalized weakness, dysarthria? and found to be in AFib with RVR    New onset AFib with RVR with troponinemia: Now converted to NSR  - Cath today  - No amio, patient refusing, understands risk and benefits  - Resume Xarelto when cleared by cardio  - Likely would benefit from sleep study as outpatient    Generalized weakness: Dehydration/orthostatic/afib vs less likely infection (WBC 11.48 initially then improved, UA neg, CXR neg, afebrile) vs doubt stroke vs other  - Keep euvolemic    HTN: BP was low, now wnl  - C/w BB    DVT ppx: Resume Xarelto post cath  Dispo: From home

## 2019-07-23 NOTE — DISCHARGE NOTE NURSING/CASE MANAGEMENT/SOCIAL WORK - NSDCDPATPORTLINK_GEN_ALL_CORE
You can access the [facility] Patient Portal, offered by [facility], by registering with the following website: [email or web address]

## 2019-07-23 NOTE — DISCHARGE NOTE PROVIDER - NSDCCPTREATMENT_GEN_ALL_CORE_FT
PRINCIPAL PROCEDURE  Procedure: Catheterization, coronary artery, with angiogram  Findings and Treatment: Mild CAD, aortic stenosis

## 2019-07-23 NOTE — DISCHARGE NOTE PROVIDER - REASON FOR ADMISSION
tachycardia , hypotension, weakness, ? slurred speech tachycardia , hypotension, weeness, ? slurred speech

## 2019-07-23 NOTE — PROGRESS NOTE ADULT - SUBJECTIVE AND OBJECTIVE BOX
24H events:  Transfered to [place] for [facility]     PAST MEDICAL & SURGICAL HISTORY  No pertinent past medical history  No significant past surgical history    SOCIAL HISTORY:  Negative for smoking/alcohol/drug use.     ALLERGIES:  No Known Allergies    MEDICATIONS:  STANDING MEDICATIONS  aspirin 325 milliGRAM(s) Oral daily  atorvastatin 80 milliGRAM(s) Oral at bedtime  chlorhexidine 4% Liquid 1 Application(s) Topical two times a day  metoprolol succinate ER 50 milliGRAM(s) Oral daily  pantoprazole    Tablet 40 milliGRAM(s) Oral before breakfast    PRN MEDICATIONS    VITALS:   T(F): 96.9  HR: 64  BP: 131/78  RR: 18  SpO2: --    LABS:                        14.7   8.80  )-----------( 218      ( 23 Jul 2019 06:01 )             41.6     07-23    138  |  103  |  16  ----------------------------<  101<H>  4.4   |  23  |  0.9    Ca    8.3<L>      23 Jul 2019 06:01  Phos  3.7     07-22  Mg     1.9     07-23    TPro  6.5  /  Alb  3.5  /  TBili  0.7  /  DBili  x   /  AST  18  /  ALT  11  /  AlkPhos  90  07-23    PT/INR - ( 23 Jul 2019 06:01 )   PT: 13.90 sec;   INR: 1.21 ratio         PTT - ( 23 Jul 2019 06:01 )  PTT:31.5 sec              RADIOLOGY:    PHYSICAL EXAM:  Unable to assess as patient transfered to [place]

## 2019-07-23 NOTE — DISCHARGE NOTE PROVIDER - NSDCFUADDINST_GEN_ALL_CORE_FT
Please followup with your primary care doctor, cardiologist and interventional cardiologist.  If symptoms recur please call your physician or return to ED.  Continue Xarelto, if you develop bleeding please call your physician or return to ED.

## 2019-07-23 NOTE — CHART NOTE - NSCHARTNOTEFT_GEN_A_CORE
Patient requests to be discharged from [facility] after cardiac catheterization today. Discussed with patient's cardiologist, resident at [facility] site and patient's attending physician and all are in agreement that patient can be discharged home safely. Will make arrangements to go home after structural cardiology consultation for TAVR today. Patient to be discharged on amiodarone 200 mg po q24h, metoprolol succinate 50 mg XL, xarelto 20 mg. Patient is agreeable to take amiodarone PO. Patient is agreeable to this plan.

## 2019-07-23 NOTE — DISCHARGE NOTE PROVIDER - CARE PROVIDER_API CALL
Selvin Celestin)  Cardiovascular Disease; Internal Medicine  [address] [place] [address], [address]  [place] [address]  Phone: [phone]  Fax: [phone]  Follow Up Time:     Abdon Jasmine)  Cardiovascular Disease; Internal Medicine  [address] [place] [address], [address]  [place] [address]  Phone: [phone]  Fax: [phone]  Follow Up Time:

## 2019-07-26 ENCOUNTER — APPOINTMENT (OUTPATIENT)
Age: 74
End: 2019-07-26
Payer: MEDICARE

## 2019-07-26 ENCOUNTER — APPOINTMENT (OUTPATIENT)
Dept: CARDIOLOGY | Facility: CLINIC | Age: 74
End: 2019-07-26
Payer: MEDICARE

## 2019-07-26 VITALS
HEART RATE: 74 BPM | RESPIRATION RATE: 13 BRPM | OXYGEN SATURATION: 93 % | TEMPERATURE: 97.7 F | SYSTOLIC BLOOD PRESSURE: 120 MMHG | DIASTOLIC BLOOD PRESSURE: 82 MMHG

## 2019-07-26 VITALS — BODY MASS INDEX: 31.5 KG/M2 | HEIGHT: 70 IN | WEIGHT: 220 LBS

## 2019-07-26 DIAGNOSIS — Z86.79 PERSONAL HISTORY OF OTHER DISEASES OF THE CIRCULATORY SYSTEM: ICD-10-CM

## 2019-07-26 DIAGNOSIS — I50.22 CHRONIC SYSTOLIC (CONGESTIVE) HEART FAILURE: ICD-10-CM

## 2019-07-26 DIAGNOSIS — R01.1 CARDIAC MURMUR, UNSPECIFIED: ICD-10-CM

## 2019-07-26 DIAGNOSIS — Z80.9 FAMILY HISTORY OF MALIGNANT NEOPLASM, UNSPECIFIED: ICD-10-CM

## 2019-07-26 DIAGNOSIS — Z78.9 OTHER SPECIFIED HEALTH STATUS: ICD-10-CM

## 2019-07-26 DIAGNOSIS — I35.0 NONRHEUMATIC AORTIC (VALVE) STENOSIS: ICD-10-CM

## 2019-07-26 PROCEDURE — 99204 OFFICE O/P NEW MOD 45 MIN: CPT

## 2019-07-26 RX ORDER — ASPIRIN 81 MG
81 TABLET, DELAYED RELEASE (ENTERIC COATED) ORAL
Refills: 0 | Status: ACTIVE | COMMUNITY

## 2019-07-26 RX ORDER — AMIODARONE HYDROCHLORIDE 400 MG/1
TABLET ORAL
Refills: 0 | Status: ACTIVE | COMMUNITY

## 2019-07-26 RX ORDER — METOPROLOL SUCCINATE 50 MG/1
50 TABLET, EXTENDED RELEASE ORAL
Refills: 0 | Status: ACTIVE | COMMUNITY

## 2019-07-26 RX ORDER — RIVAROXABAN 2.5 MG/1
TABLET, FILM COATED ORAL
Refills: 0 | Status: ACTIVE | COMMUNITY

## 2019-07-26 NOTE — DATA REVIEWED
[FreeTextEntry1] : \par  EXAM:  TRANSTHORACIC ECHO COMPL  \par \par \par PROCEDURE DATE:  07/23/2019  \par \par INTERPRETATION:  REPORT:  \par TRANSTHORACIC ECHOCARDIOGRAM REPORT\par \par  \par \par Patient Name:   AMELIA SHAW Accession #: [id number]\par Medical Rec #:  [id number]    Height:      70.0 in 177.8 cm\par YOB: 1945    Weight:      210.0 lb 95.25 kg\par Patient Age:    73 years     BSA:         2.13 m²\par Patient Gender: M            BP:          130/75 mmHg\par  \par \par Date of Exam:        7/23/2019 2:46:16 PM\par Referring Physician: [id number] ED UNASSIGNED\par Sonographer:         Katarina Hickey\par Reading Physician:   Charles Corrales M.D.\par \par Procedure:   2D Echo/Doppler/Color Doppler Complete.\par Indications: R01.1 - Cardiac Murmur, unspecified\par Diagnosis:   Cardiac murmur, unspecified - R01.1\par \par  \par \par Summary:\par  1. Normal global left ventricular systolic function.\par  2. LV Ejection Fraction by Rock's Method with a biplane EF of 66 %.\par  3. Mildly increased LV wall thickness.\par  4. Spectral Doppler shows impaired relaxation pattern of left \par ventricular myocardial filling (Grade I diastolic dysfunction).\par  5. Mitral annular calcification.\par  6. Thickening and calcification of the anterior and posterior mitral \par valve leaflets.\par  7. Peak transaortic gradient equals 51.1 mmHg, mean transaortic gradient \par equals 23.4 mmHg, the calculated aortic valve area equals 1.29 cm² by the \par continuity equation consistent with moderate aortic stenosis.\par \par PHYSICIAN INTERPRETATION:\par Left Ventricle: The left ventricular internal cavity size is normal. Left \par ventricular wall thickness is mildly increased. Global LV systolic \par function was normal. Spectral Doppler shows impaired relaxation pattern \par of left ventricular myocardial filling (Grade I diastolic dysfunction).\par Right Ventricle: Normal right ventricular size and function.\par Left Atrium: Normal left atrial size.\par Right Atrium: Normal right atrial size.\par Pericardium: There is no evidence of pericardial effusion.\par Mitral Valve: Thickening and calcification of the anterior and posterior \par mitral valve leaflets. There is mitral annular calcification. No evidence \par of mitral stenosis. Trace mitral valve regurgitation is seen.\par Tricuspid Valve: Mild tricuspid regurgitation is visualized.\par Aortic Valve: Peak transaortic gradient equals 51.1 mmHg, mean \par transaortic gradient equals 23.4 mmHg, the calculated aortic valve area \par equals 1.29 cm² by the continuity equation consistent with moderate \par aortic stenosis. Trivial aortic valve regurgitation is seen.\par Pulmonic Valve: Mild pulmonic valve regurgitation.\par Aorta: The aortic root is normal in size and structure.\par  \par \par 2D AND M-MODE MEASUREMENTS (normal ranges within parentheses):\par Left                  Normal   Aorta/Left             Normal\par Ventricle:                     Atrium:\par IVSd (2D):  1.60 cm  (0.7-1.1) AoV Cusp       0.83  (1.5-2.6)\par LVPWd       1.45 cm  (0.7-1.1) Separation:     cm\par (2D):                          Left Atrium    3.82  (1.9-4.0)\par LVIDd       3.08 cm  (3.4-5.7) (Mmode):        cm\par (2D):                          LA Volume      23.3\par LVIDs       2.30 cm            Index         ml/m²\par (2D):                          Right\par LV FS       25.3 %    (>25%)   Ventricle:\par (2D):                          RVd (2D):      3.03 cm\par IVSd        1.36 cm  (0.7-1.1)\par (Mmode):\par LVPWd       0.89 cm  (0.7-1.1)\par (Mmode):\par LVIDd       4.65 cm  (3.4-5.7)\par (Mmode):\par LVIDs       2.84 cm\par (Mmode):\par LV FS       39.1 %    (>25%)\par (Mmode):\par Relative     0.94     (<0.42)\par Wall\par Thickness\par Rel. Wall    0.38     (<0.42)\par Thickness\par Mm\par LV Mass    89.5 g/m²\par Index:\par Mmode\par \par SPECTRAL DOPPLER ANALYSIS:\par LV DIASTOLIC FUNCTION:\par MV Peak E: 0.78 m/s Decel Time: 276 msec\par MV Peak A: 1.05 m/s\par E/A Ratio: 0.74\par \par Aortic Valve:\par AoV VMax:    3.57 m/s  AoV Area, Vmax:       1.11 cm² Vmax Indx: 0.52 \par cm²/m²\par AoV VTI:     0.70 m    AoV Area, VTI:        1.29 cm² VTI Indx:  0.61 \par cm²/m²\par AoV Pk Grad: 51.1 mmHg AoV Area, Planimetry: 1.59 cm²\par AoV Mn Grad: 23.4 mmHg\par \par LVOT Vmax: 1.20 m/s\par LVOT VTI:  0.27 m\par LVOT Diam: 2.05 cm\par \par Mitral Valve:\par MV P1/2 Time: 79.99 msec\par MV Area, PHT: 2.75 cm²\par \par Tricuspid Valve and PA/RV Systolic Pressure: TR Max Velocity: 1.35 m/s RA \par Pressure: 3 mmHg RVSP/PASP: 10.3 mmHg\par \par  \par [id number] Charles Corrales M.D., Electronically signed on 7/24/2019 at 8:20:16 AM\par  \par \par  *** Final ***\par \par   CHARLES CORRALES MD\par This document has been electronically signed. Jul 23 2019  2:46PM\par   \par \par   \par \par \par EXAM:  CAROTID DUPLEX BILATERAL      \par \par \par PROCEDURE DATE:  07/19/2019  \par \par \par \par \par INTERPRETATION:  Clinical History / Reason for exam: The patient is \par 73-year-old male with CVA.  The study was performed to evaluate the \par patient for carotid artery stenosis.\par \par Duplex evaluation of the carotid arteries was performed bilaterally.\par In the right carotid system, the internal carotid artery is noted to be \par patent with a peak systolic velocity of 67.2 cm/sec over end diastolic \par velocity of 34.6 cm/sec.\par \par In the left carotid system, the internal carotid artery is noted to be \par patent with a peak systolic velocity of 64.6 cm/sec over end diastolic \par velocity of 28.3 cm/sec.\par \par The right vertebral arterial flow was antegrade.\par The left vertebral arterial flow was antegrade.\par \par Impression:\par \par 20-39% stenosis of the right internal carotid artery.\par 20-39% stenosis of the left internal carotid artery.\par \par ICD-10: I63.8\par \par \par KANU HERNADEZ M.D., ATTENDING VASCULAR\par This document has been electronically signed. Jul 19 2019 12:51PM\par   \par \par EXAM:  CT ANGIO NECK (W)AW IC      \par EXAM:  CT ANGIO BRAIN (W)AW IC      \par \par \par PROCEDURE DATE:  07/18/2019  \par \par \par \par \par INTERPRETATION:  Clinical History / Reason for exam: Dizziness, stroke \par workup.\par \par Technique: CT angiogram of the head and neck. CTA of the head and neck \par was performed following the intravenous administration of 100 cc Optiray \par 320 (0 cc discarded) with coronal, sagittal and multiple 3-D MIP and \par volume rendered reformats.\par \par Comparison: None\par \par Findings: \par \par NECK:\par The visualized aortic arch and great vessel origins are patent.\par \par The right common, internal and external carotid arteries are patent.\par \par The left common, internal and external carotid arteries are patent.\par \par The vertebral arteries are patent.\par \par HEAD:\par The distal internal carotid arteries are patent. The anterior and middle \par cerebral arteries are patent. \par \par The distal vertebral arteries are patent.  The basilar artery is patent. \par The posterior cerebral arteries are patent.\par \par OTHER:\par There are mild to moderate cervical spine degenerative changes.\par \par IMPRESSION: \par \par No evidence of major vascular stenosis or occlusion.\par \par \par \par \par \par \par \par \par YEIMY KIM M.D., ATTENDING RADIOLOGIST\par This document has been electronically signed. Jul 18 2019  4:31PM\par   \par \par   EXAM:  CT ANGIO NECK (W)AW IC      \par EXAM:  CT ANGIO BRAIN (W)AW IC      \par \par \par PROCEDURE DATE:  07/18/2019  \par \par \par \par \par INTERPRETATION:  Clinical History / Reason for exam: Dizziness, stroke \par workup.\par \par Technique: CT angiogram of the head and neck. CTA of the head and neck \par was performed following the intravenous administration of 100 cc Optiray \par 320 (0 cc discarded) with coronal, sagittal and multiple 3-D MIP and \par volume rendered reformats.\par \par Comparison: None\par \par Findings: \par \par NECK:\par The visualized aortic arch and great vessel origins are patent.\par \par The right common, internal and external carotid arteries are patent.\par \par The left common, internal and external carotid arteries are patent.\par \par The vertebral arteries are patent.\par \par HEAD:\par The distal internal carotid arteries are patent. The anterior and middle \par cerebral arteries are patent. \par \par The distal vertebral arteries are patent.  The basilar artery is patent. \par The posterior cerebral arteries are patent.\par \par OTHER:\par There are mild to moderate cervical spine degenerative changes.\par \par IMPRESSION: \par \par No evidence of major vascular stenosis or occlusion.\par \par \par EXAM:  CT BRAIN STROKE PROTOCOL      \par \par \par PROCEDURE DATE:  07/18/2019  \par \par \par \par \par INTERPRETATION:  Clinical History / Reason for exam: Stroke code\par \par Technique: Multiple contiguous axial CT images of the brain were obtained \par from base to vertex without administration of intravenous contrast.  \par \par Comparison: None available\par \par Findings:  \par \par Ventricular system: Age-appropriate.\par \par Brain parenchyma: Unremarkable.\par \par ASPECT score: 10\par \par Intracranial hemorrhage: None.\par \par Mass effect/Midline shift: None. \par \par Intracranial vessels: Unremarkable\par \par Paranasal sinuses and mastoid air cells: Unremarkable.\par \par Osseous structures: Unremarkable.\par \par IMPRESSION:  \par \par No evidence of acute intracranial hemorrhage or large territory infarct.\par \par \par Dr. Katerina Chan discussed preliminary findings with MARGE LOPES on \par 7/18/2019 4:19 PM with readback.\par \par \par \par \par \par \par KATERINA CHAN M.D., RESIDENT RADIOLOGIST\par This document has been electronically signed.\par YEIMY KIM M.D., ATTENDING RADIOLOGIST\par This document has been electronically signed. Jul 18 2019  4:24PM\par   \par \par   \par \par \par \par \par \par \par YEIMY KIM M.D., ATTENDING RADIOLOGIST\par This document has been electronically signed. Jul 18 2019  4:31PM\par   \par \par   \par \par

## 2019-07-26 NOTE — ASSESSMENT
[FreeTextEntry1] : patient has no symptoms at all at this time. feels well. Echo did not suggest severe AS but acc to Dr Eddy the cath w gradient was significant. will review the cath gradients and make a decision.

## 2019-07-26 NOTE — PHYSICAL EXAM
[General Appearance - In No Acute Distress] : in no acute distress [General Appearance - Alert] : alert [Heart Rate And Rhythm] : heart rate was normal and rhythm regular [] : no respiratory distress [Auscultation Breath Sounds / Voice Sounds] : lungs were clear to auscultation bilaterally [Systolic grade ___/6] : A grade [unfilled]/6 systolic murmur was heard. [Musculoskeletal - Swelling] : no joint swelling seen [Abnormal Walk] : normal gait [Nail Clubbing] : no clubbing  or cyanosis of the fingernails [Deep Tendon Reflexes (DTR)] : deep tendon reflexes were 2+ and symmetric [No Focal Deficits] : no focal deficits [Sensation] : the sensory exam was normal to light touch and pinprick [Motor Tone] : muscle strength and tone were normal [Oriented To Time, Place, And Person] : oriented to person, place, and time [Impaired Insight] : insight and judgment were intact [Affect] : the affect was normal

## 2019-07-26 NOTE — PHYSICAL EXAM
[] : no respiratory distress [General Appearance - In No Acute Distress] : in no acute distress [General Appearance - Alert] : alert [Heart Rate And Rhythm] : heart rate was normal and rhythm regular [Systolic grade ___/6] : A grade [unfilled]/6 systolic murmur was heard. [Auscultation Breath Sounds / Voice Sounds] : lungs were clear to auscultation bilaterally [Nail Clubbing] : no clubbing  or cyanosis of the fingernails [Abnormal Walk] : normal gait [Motor Tone] : muscle strength and tone were normal [Musculoskeletal - Swelling] : no joint swelling seen [No Focal Deficits] : no focal deficits [Sensation] : the sensory exam was normal to light touch and pinprick [Deep Tendon Reflexes (DTR)] : deep tendon reflexes were 2+ and symmetric [Oriented To Time, Place, And Person] : oriented to person, place, and time [Impaired Insight] : insight and judgment were intact [Affect] : the affect was normal

## 2019-07-26 NOTE — REASON FOR VISIT
[Consultation] : a consultation visit [FreeTextEntry1] : Aortic valvular diseases of aortic stenosis

## 2019-07-26 NOTE — HISTORY OF PRESENT ILLNESS
[Hypertension] : Hypertension [FreeTextEntry1] : Pt is 73y, gentlemen who underwent a cardiac catherization with Dr. Shaw on 7/23/19 -resulting in  non-obs CAD after presenting to the  ED [facility] c/o of dizziness.  Pt was found to be in new onset rapid A-fib and transferred to the [facility] for cardiac work-up.  \par \par Hospital Course: \par Discharge Date	23-Jul-2019 \par Admission Date	18-Jul-2019 18:50 \par Reason for Admission:	tachycardia , hypotension, weekness, ? slurred speech \par \par 73M with HTN, DVT now off AC presented for generalized weakness and dysarthria and found to be in AFib with RVR. Stroke code initially called, CTH negative for acute pathology, patient given pushes of Cardizem and metoprolol however he became hypotensive and was given several boluses throughout the night. He was then switched to amiodarone, converted to NSR and then amiodarone was stopped \par due to patient refusal. Patient placed on anticoagulation. He had a troponin of 0.1 x3. He went for cath which showed normal EF with severe aortic stenosis, and he was evaluated by the TAVR team for possible replacement as outpatient.  TSH was wnl , stable for DC. \par \par Echo indicates:\par Aortic Valve: Peak transaortic gradient equals 51.1 mmHg, mean transaortic gradient equals 23.4 mmHg, the calculated aortic valve area equals 1.29 cm² by the continuity equation consistent with moderate \par aortic stenosis. Trivial aortic valve regurgitation is seen.\par \par His healthcare team is as follows:\par PMD: Brett\par Cardio: [place]\par Pulm: none\par EPS: none\par Hem/onc: none\par Renal: none\par Endocrine: none\par GI: none\par Vasc: Tarantini (for superficial clot in leg) [Diabetes Mellitus] : no Diabetes Melllitus [Dialysis] : no dialysis [Dyslipidemia] : no dyslipidemia [Home Oxygen] : no home oxygen use [Infectious Endocarditis] : no infectious endocarditis [Inhaled Medication Therapy] : no inhaled medication therapy [Sleep Apnea] : no sleep apnea [Liver Disease] : no liver disease [Immunocompromise Present] : not immunocompromised [Peripheral Artery Disease] : no peripheral artery disease [Unresponsive Neurologic State] : not in a unresponsive neurologic state [Syncope] : no syncope [Cerebrovascular Disease] : no cerebrovascular disease [Prior CVA] : with no prior CVA [CVD TIA] : no CVD Tia [Prior Myocardial Infarction] : No prior myocardial infarction [Prior Heart Failure] : no prior heart failure

## 2019-07-30 DIAGNOSIS — R42 DIZZINESS AND GIDDINESS: ICD-10-CM

## 2019-07-30 DIAGNOSIS — I95.9 HYPOTENSION, UNSPECIFIED: ICD-10-CM

## 2019-07-30 DIAGNOSIS — Z86.718 PERSONAL HISTORY OF OTHER VENOUS THROMBOSIS AND EMBOLISM: ICD-10-CM

## 2019-07-30 DIAGNOSIS — I10 ESSENTIAL (PRIMARY) HYPERTENSION: ICD-10-CM

## 2019-07-30 DIAGNOSIS — I48.91 UNSPECIFIED ATRIAL FIBRILLATION: ICD-10-CM

## 2019-07-30 DIAGNOSIS — R47.1 DYSARTHRIA AND ANARTHRIA: ICD-10-CM

## 2019-07-30 DIAGNOSIS — I25.10 ATHEROSCLEROTIC HEART DISEASE OF NATIVE CORONARY ARTERY WITHOUT ANGINA PECTORIS: ICD-10-CM

## 2019-07-30 DIAGNOSIS — E86.0 DEHYDRATION: ICD-10-CM

## 2019-07-30 DIAGNOSIS — I35.0 NONRHEUMATIC AORTIC (VALVE) STENOSIS: ICD-10-CM

## 2019-07-30 NOTE — DATA REVIEWED
[FreeTextEntry1] : \par  EXAM:  TRANSTHORACIC ECHO COMPL  \par \par \par PROCEDURE DATE:  07/23/2019  \par \par INTERPRETATION:  REPORT:  \par TRANSTHORACIC ECHOCARDIOGRAM REPORT\par \par  \par \par Patient Name:   AMELIA SHAW Accession #: [id number]\par Medical Rec #:  [id number]    Height:      70.0 in 177.8 cm\par YOB: 1945    Weight:      210.0 lb 95.25 kg\par Patient Age:    73 years     BSA:         2.13 m²\par Patient Gender: M            BP:          130/75 mmHg\par  \par \par Date of Exam:        7/23/2019 2:46:16 PM\par Referring Physician: [id number] ED UNASSIGNED\par Sonographer:         Katarina Hickey\par Reading Physician:   Charles Corrales M.D.\par \par Procedure:   2D Echo/Doppler/Color Doppler Complete.\par Indications: R01.1 - Cardiac Murmur, unspecified\par Diagnosis:   Cardiac murmur, unspecified - R01.1\par \par  \par \par Summary:\par  1. Normal global left ventricular systolic function.\par  2. LV Ejection Fraction by Rock's Method with a biplane EF of 66 %.\par  3. Mildly increased LV wall thickness.\par  4. Spectral Doppler shows impaired relaxation pattern of left \par ventricular myocardial filling (Grade I diastolic dysfunction).\par  5. Mitral annular calcification.\par  6. Thickening and calcification of the anterior and posterior mitral \par valve leaflets.\par  7. Peak transaortic gradient equals 51.1 mmHg, mean transaortic gradient \par equals 23.4 mmHg, the calculated aortic valve area equals 1.29 cm² by the \par continuity equation consistent with moderate aortic stenosis.\par \par PHYSICIAN INTERPRETATION:\par Left Ventricle: The left ventricular internal cavity size is normal. Left \par ventricular wall thickness is mildly increased. Global LV systolic \par function was normal. Spectral Doppler shows impaired relaxation pattern \par of left ventricular myocardial filling (Grade I diastolic dysfunction).\par Right Ventricle: Normal right ventricular size and function.\par Left Atrium: Normal left atrial size.\par Right Atrium: Normal right atrial size.\par Pericardium: There is no evidence of pericardial effusion.\par Mitral Valve: Thickening and calcification of the anterior and posterior \par mitral valve leaflets. There is mitral annular calcification. No evidence \par of mitral stenosis. Trace mitral valve regurgitation is seen.\par Tricuspid Valve: Mild tricuspid regurgitation is visualized.\par Aortic Valve: Peak transaortic gradient equals 51.1 mmHg, mean \par transaortic gradient equals 23.4 mmHg, the calculated aortic valve area \par equals 1.29 cm² by the continuity equation consistent with moderate \par aortic stenosis. Trivial aortic valve regurgitation is seen.\par Pulmonic Valve: Mild pulmonic valve regurgitation.\par Aorta: The aortic root is normal in size and structure.\par  \par \par 2D AND M-MODE MEASUREMENTS (normal ranges within parentheses):\par Left                  Normal   Aorta/Left             Normal\par Ventricle:                     Atrium:\par IVSd (2D):  1.60 cm  (0.7-1.1) AoV Cusp       0.83  (1.5-2.6)\par LVPWd       1.45 cm  (0.7-1.1) Separation:     cm\par (2D):                          Left Atrium    3.82  (1.9-4.0)\par LVIDd       3.08 cm  (3.4-5.7) (Mmode):        cm\par (2D):                          LA Volume      23.3\par LVIDs       2.30 cm            Index         ml/m²\par (2D):                          Right\par LV FS       25.3 %    (>25%)   Ventricle:\par (2D):                          RVd (2D):      3.03 cm\par IVSd        1.36 cm  (0.7-1.1)\par (Mmode):\par LVPWd       0.89 cm  (0.7-1.1)\par (Mmode):\par LVIDd       4.65 cm  (3.4-5.7)\par (Mmode):\par LVIDs       2.84 cm\par (Mmode):\par LV FS       39.1 %    (>25%)\par (Mmode):\par Relative     0.94     (<0.42)\par Wall\par Thickness\par Rel. Wall    0.38     (<0.42)\par Thickness\par Mm\par LV Mass    89.5 g/m²\par Index:\par Mmode\par \par SPECTRAL DOPPLER ANALYSIS:\par LV DIASTOLIC FUNCTION:\par MV Peak E: 0.78 m/s Decel Time: 276 msec\par MV Peak A: 1.05 m/s\par E/A Ratio: 0.74\par \par Aortic Valve:\par AoV VMax:    3.57 m/s  AoV Area, Vmax:       1.11 cm² Vmax Indx: 0.52 \par cm²/m²\par AoV VTI:     0.70 m    AoV Area, VTI:        1.29 cm² VTI Indx:  0.61 \par cm²/m²\par AoV Pk Grad: 51.1 mmHg AoV Area, Planimetry: 1.59 cm²\par AoV Mn Grad: 23.4 mmHg\par \par LVOT Vmax: 1.20 m/s\par LVOT VTI:  0.27 m\par LVOT Diam: 2.05 cm\par \par Mitral Valve:\par MV P1/2 Time: 79.99 msec\par MV Area, PHT: 2.75 cm²\par \par Tricuspid Valve and PA/RV Systolic Pressure: TR Max Velocity: 1.35 m/s RA \par Pressure: 3 mmHg RVSP/PASP: 10.3 mmHg\par \par  \par [id number] Charles Corrales M.D., Electronically signed on 7/24/2019 at 8:20:16 AM\par  \par \par  *** Final ***\par \par   CHARLES CORRALES MD\par This document has been electronically signed. Jul 23 2019  2:46PM\par   \par \par   \par \par \par EXAM:  CAROTID DUPLEX BILATERAL      \par \par \par PROCEDURE DATE:  07/19/2019  \par \par \par \par \par INTERPRETATION:  Clinical History / Reason for exam: The patient is \par 73-year-old male with CVA.  The study was performed to evaluate the \par patient for carotid artery stenosis.\par \par Duplex evaluation of the carotid arteries was performed bilaterally.\par In the right carotid system, the internal carotid artery is noted to be \par patent with a peak systolic velocity of 67.2 cm/sec over end diastolic \par velocity of 34.6 cm/sec.\par \par In the left carotid system, the internal carotid artery is noted to be \par patent with a peak systolic velocity of 64.6 cm/sec over end diastolic \par velocity of 28.3 cm/sec.\par \par The right vertebral arterial flow was antegrade.\par The left vertebral arterial flow was antegrade.\par \par Impression:\par \par 20-39% stenosis of the right internal carotid artery.\par 20-39% stenosis of the left internal carotid artery.\par \par ICD-10: I63.8\par \par \par KANU HERNADEZ M.D., ATTENDING VASCULAR\par This document has been electronically signed. Jul 19 2019 12:51PM\par   \par \par EXAM:  CT ANGIO NECK (W)AW IC      \par EXAM:  CT ANGIO BRAIN (W)AW IC      \par \par \par PROCEDURE DATE:  07/18/2019  \par \par \par \par \par INTERPRETATION:  Clinical History / Reason for exam: Dizziness, stroke \par workup.\par \par Technique: CT angiogram of the head and neck. CTA of the head and neck \par was performed following the intravenous administration of 100 cc Optiray \par 320 (0 cc discarded) with coronal, sagittal and multiple 3-D MIP and \par volume rendered reformats.\par \par Comparison: None\par \par Findings: \par \par NECK:\par The visualized aortic arch and great vessel origins are patent.\par \par The right common, internal and external carotid arteries are patent.\par \par The left common, internal and external carotid arteries are patent.\par \par The vertebral arteries are patent.\par \par HEAD:\par The distal internal carotid arteries are patent. The anterior and middle \par cerebral arteries are patent. \par \par The distal vertebral arteries are patent.  The basilar artery is patent. \par The posterior cerebral arteries are patent.\par \par OTHER:\par There are mild to moderate cervical spine degenerative changes.\par \par IMPRESSION: \par \par No evidence of major vascular stenosis or occlusion.\par \par \par \par \par \par \par \par \par YEIMY KIM M.D., ATTENDING RADIOLOGIST\par This document has been electronically signed. Jul 18 2019  4:31PM\par   \par \par   EXAM:  CT ANGIO NECK (W)AW IC      \par EXAM:  CT ANGIO BRAIN (W)AW IC      \par \par \par PROCEDURE DATE:  07/18/2019  \par \par \par \par \par INTERPRETATION:  Clinical History / Reason for exam: Dizziness, stroke \par workup.\par \par Technique: CT angiogram of the head and neck. CTA of the head and neck \par was performed following the intravenous administration of 100 cc Optiray \par 320 (0 cc discarded) with coronal, sagittal and multiple 3-D MIP and \par volume rendered reformats.\par \par Comparison: None\par \par Findings: \par \par NECK:\par The visualized aortic arch and great vessel origins are patent.\par \par The right common, internal and external carotid arteries are patent.\par \par The left common, internal and external carotid arteries are patent.\par \par The vertebral arteries are patent.\par \par HEAD:\par The distal internal carotid arteries are patent. The anterior and middle \par cerebral arteries are patent. \par \par The distal vertebral arteries are patent.  The basilar artery is patent. \par The posterior cerebral arteries are patent.\par \par OTHER:\par There are mild to moderate cervical spine degenerative changes.\par \par IMPRESSION: \par \par No evidence of major vascular stenosis or occlusion.\par \par \par EXAM:  CT BRAIN STROKE PROTOCOL      \par \par \par PROCEDURE DATE:  07/18/2019  \par \par \par \par \par INTERPRETATION:  Clinical History / Reason for exam: Stroke code\par \par Technique: Multiple contiguous axial CT images of the brain were obtained \par from base to vertex without administration of intravenous contrast.  \par \par Comparison: None available\par \par Findings:  \par \par Ventricular system: Age-appropriate.\par \par Brain parenchyma: Unremarkable.\par \par ASPECT score: 10\par \par Intracranial hemorrhage: None.\par \par Mass effect/Midline shift: None. \par \par Intracranial vessels: Unremarkable\par \par Paranasal sinuses and mastoid air cells: Unremarkable.\par \par Osseous structures: Unremarkable.\par \par IMPRESSION:  \par \par No evidence of acute intracranial hemorrhage or large territory infarct.\par \par \par Dr. Katerina Chan discussed preliminary findings with MARGE LOPES on \par 7/18/2019 4:19 PM with readback.\par \par \par \par \par \par \par KATERINA CHAN M.D., RESIDENT RADIOLOGIST\par This document has been electronically signed.\par YEIMY KIM M.D., ATTENDING RADIOLOGIST\par This document has been electronically signed. Jul 18 2019  4:24PM\par   \par \par   \par \par \par \par \par \par \par YEIMY KIM M.D., ATTENDING RADIOLOGIST\par This document has been electronically signed. Jul 18 2019  4:31PM\par   \par \par   \par \par

## 2019-07-30 NOTE — ASSESSMENT
[FreeTextEntry1] : patient has no symptoms at all at this time. feels well. Echo did not suggest severe AS but acc to Dr Lepe the cath w gradient was significant. will review the cath gradients and make a decision. \par \par RTO in 6 months or sooner if needed\par obtain TTE in 6 months\par Continue medication regimen\par f/u with Dr. Lepe

## 2019-07-30 NOTE — END OF VISIT
[FreeTextEntry3] : Seen / examined and above NP documentation reviewed.\par \par Near-syncope in setting of new-onset AF.\par Otherwise, minimal symptoms.  Good functional capacity without chest pain / dyspnea.\par \par ECHO reviewed.  AV is calcified / restricted, but gradients and FREDY sub-severe with reasonably good tracings / measurements.  LBV is hyperdynamic with mid cavity obliteration.\par \par Unclear mean gradient at cath.  Will review tracings.\par \par Spoke with Dr. Eddy.  Will review recent outpatient ECHO's.  If sum of invasive / non-invasive assessment suggests severe AS, will initiate TAVR evaluation.  Otherwise, close clinical monitoring, beta-blockade, and follow-up ECHO 3-6 months seems appropriate.  \par \par 30-minutes spent discussing with patient / family, reviewing imaging, and speaking with referring physician.

## 2019-07-30 NOTE — HISTORY OF PRESENT ILLNESS
[Hypertension] : Hypertension [FreeTextEntry1] : Pt is 73y, gentlemen who underwent a cardiac catherization with Dr. Shaw on 7/23/19 -resulting in  non-obs CAD after presenting to the  ED [facility] c/o of dizziness.  Pt was found to be in new onset rapid A-fib and transferred to the [facility] for cardiac work-up.  \par \par Hospital Course: \par Discharge Date	23-Jul-2019 \par Admission Date	18-Jul-2019 18:50 \par Reason for Admission:	tachycardia , hypotension, weekness, ? slurred speech \par \par 73M with HTN, DVT now off AC presented for generalized weakness and dysarthria and found to be in AFib with RVR. Stroke code initially called, CTH negative for acute pathology, patient given pushes of Cardizem and metoprolol however he became hypotensive and was given several boluses throughout the night. He was then switched to amiodarone, converted to NSR and then amiodarone was stopped \par due to patient refusal. Patient placed on anticoagulation. He had a troponin of 0.1 x3. He went for cath which showed normal EF with severe aortic stenosis, and he was evaluated by the TAVR team for possible replacement as outpatient.  TSH was wnl , stable for DC. \par \par Echo indicates:\par Aortic Valve: Peak transaortic gradient equals 51.1 mmHg, mean transaortic gradient equals 23.4 mmHg, the calculated aortic valve area equals 1.29 cm² by the continuity equation consistent with moderate \par aortic stenosis. Trivial aortic valve regurgitation is seen.\par \par His healthcare team is as follows:\par PMD: Brett\par Cardio: [place]\par Pulm: none\par EPS: none\par Hem/onc: none\par Renal: none\par Endocrine: none\par GI: none\par Vasc: Tarantini (for superficial clot in leg) [Diabetes Mellitus] : no Diabetes Melllitus [Dyslipidemia] : no dyslipidemia [Dialysis] : no dialysis [Infectious Endocarditis] : no infectious endocarditis [Home Oxygen] : no home oxygen use [Inhaled Medication Therapy] : no inhaled medication therapy [Sleep Apnea] : no sleep apnea [Liver Disease] : no liver disease [Immunocompromise Present] : not immunocompromised [Peripheral Artery Disease] : no peripheral artery disease [Unresponsive Neurologic State] : not in a unresponsive neurologic state [Syncope] : no syncope [Cerebrovascular Disease] : no cerebrovascular disease [Prior CVA] : with no prior CVA [CVD TIA] : no CVD Tia [Prior Myocardial Infarction] : No prior myocardial infarction [Prior Heart Failure] : no prior heart failure

## 2019-09-05 ENCOUNTER — OUTPATIENT (OUTPATIENT)
Dept: OUTPATIENT SERVICES | Facility: HOSPITAL | Age: 74
LOS: 1 days | Discharge: HOME | End: 2019-09-05

## 2019-09-05 DIAGNOSIS — Z79.01 LONG TERM (CURRENT) USE OF ANTICOAGULANTS: ICD-10-CM

## 2019-09-05 DIAGNOSIS — I48.91 UNSPECIFIED ATRIAL FIBRILLATION: ICD-10-CM

## 2019-09-05 LAB
POCT INR: 2.6 RATIO — HIGH (ref 0.9–1.2)
POCT PT: 31.3 SEC — HIGH (ref 10–13.4)

## 2019-09-07 ENCOUNTER — OUTPATIENT (OUTPATIENT)
Dept: OUTPATIENT SERVICES | Facility: HOSPITAL | Age: 74
LOS: 1 days | Discharge: HOME | End: 2019-09-07

## 2019-09-07 DIAGNOSIS — Z79.01 LONG TERM (CURRENT) USE OF ANTICOAGULANTS: ICD-10-CM

## 2019-09-07 DIAGNOSIS — I48.91 UNSPECIFIED ATRIAL FIBRILLATION: ICD-10-CM

## 2019-09-07 LAB
POCT INR: 1.9 RATIO — HIGH (ref 0.9–1.2)
POCT PT: 22.9 SEC — HIGH (ref 10–13.4)

## 2019-09-11 ENCOUNTER — OUTPATIENT (OUTPATIENT)
Dept: OUTPATIENT SERVICES | Facility: HOSPITAL | Age: 74
LOS: 1 days | Discharge: HOME | End: 2019-09-11

## 2019-09-11 DIAGNOSIS — I48.91 UNSPECIFIED ATRIAL FIBRILLATION: ICD-10-CM

## 2019-09-11 DIAGNOSIS — Z79.01 LONG TERM (CURRENT) USE OF ANTICOAGULANTS: ICD-10-CM

## 2019-09-11 LAB
POCT INR: 1.8 RATIO — HIGH (ref 0.9–1.2)
POCT PT: 21.3 SEC — HIGH (ref 10–13.4)

## 2019-09-16 ENCOUNTER — OUTPATIENT (OUTPATIENT)
Dept: OUTPATIENT SERVICES | Facility: HOSPITAL | Age: 74
LOS: 1 days | Discharge: HOME | End: 2019-09-16

## 2019-09-16 DIAGNOSIS — I48.91 UNSPECIFIED ATRIAL FIBRILLATION: ICD-10-CM

## 2019-09-16 DIAGNOSIS — Z79.01 LONG TERM (CURRENT) USE OF ANTICOAGULANTS: ICD-10-CM

## 2019-09-16 LAB
POCT INR: 1.8 RATIO — HIGH (ref 0.9–1.2)
POCT PT: 21.5 SEC — HIGH (ref 10–13.4)

## 2019-09-23 ENCOUNTER — OUTPATIENT (OUTPATIENT)
Dept: OUTPATIENT SERVICES | Facility: HOSPITAL | Age: 74
LOS: 1 days | Discharge: HOME | End: 2019-09-23

## 2019-09-23 DIAGNOSIS — Z79.01 LONG TERM (CURRENT) USE OF ANTICOAGULANTS: ICD-10-CM

## 2019-09-23 DIAGNOSIS — I48.91 UNSPECIFIED ATRIAL FIBRILLATION: ICD-10-CM

## 2019-09-23 LAB
POCT INR: 1.9 RATIO — HIGH (ref 0.9–1.2)
POCT PT: 23.1 SEC — HIGH (ref 10–13.4)

## 2019-09-30 ENCOUNTER — OUTPATIENT (OUTPATIENT)
Dept: OUTPATIENT SERVICES | Facility: HOSPITAL | Age: 74
LOS: 1 days | Discharge: HOME | End: 2019-09-30

## 2019-09-30 DIAGNOSIS — I48.91 UNSPECIFIED ATRIAL FIBRILLATION: ICD-10-CM

## 2019-09-30 DIAGNOSIS — Z79.01 LONG TERM (CURRENT) USE OF ANTICOAGULANTS: ICD-10-CM

## 2019-09-30 LAB
POCT INR: 1.9 RATIO — HIGH (ref 0.9–1.2)
POCT PT: 23.2 SEC — HIGH (ref 10–13.4)

## 2019-10-07 ENCOUNTER — OUTPATIENT (OUTPATIENT)
Dept: OUTPATIENT SERVICES | Facility: HOSPITAL | Age: 74
LOS: 1 days | Discharge: HOME | End: 2019-10-07

## 2019-10-07 DIAGNOSIS — Z79.01 LONG TERM (CURRENT) USE OF ANTICOAGULANTS: ICD-10-CM

## 2019-10-07 DIAGNOSIS — I48.91 UNSPECIFIED ATRIAL FIBRILLATION: ICD-10-CM

## 2019-10-07 LAB
POCT INR: 2.5 RATIO — HIGH (ref 0.9–1.2)
POCT PT: 30.1 SEC — HIGH (ref 10–13.4)

## 2019-10-15 ENCOUNTER — OUTPATIENT (OUTPATIENT)
Dept: OUTPATIENT SERVICES | Facility: HOSPITAL | Age: 74
LOS: 1 days | Discharge: HOME | End: 2019-10-15

## 2019-10-15 DIAGNOSIS — Z79.01 LONG TERM (CURRENT) USE OF ANTICOAGULANTS: ICD-10-CM

## 2019-10-15 DIAGNOSIS — I48.91 UNSPECIFIED ATRIAL FIBRILLATION: ICD-10-CM

## 2019-10-28 ENCOUNTER — OUTPATIENT (OUTPATIENT)
Dept: OUTPATIENT SERVICES | Facility: HOSPITAL | Age: 74
LOS: 1 days | Discharge: HOME | End: 2019-10-28

## 2019-10-28 DIAGNOSIS — I48.91 UNSPECIFIED ATRIAL FIBRILLATION: ICD-10-CM

## 2019-10-28 DIAGNOSIS — Z79.01 LONG TERM (CURRENT) USE OF ANTICOAGULANTS: ICD-10-CM

## 2019-10-28 LAB
POCT INR: 2 RATIO — HIGH (ref 0.9–1.2)
POCT PT: 24.2 SEC — HIGH (ref 10–13.4)

## 2020-03-05 ENCOUNTER — OUTPATIENT (OUTPATIENT)
Dept: OUTPATIENT SERVICES | Facility: HOSPITAL | Age: 75
LOS: 1 days | Discharge: HOME | End: 2020-03-05

## 2020-03-05 DIAGNOSIS — I35.1 NONRHEUMATIC AORTIC (VALVE) INSUFFICIENCY: ICD-10-CM

## 2020-03-05 DIAGNOSIS — Z95.2 PRESENCE OF PROSTHETIC HEART VALVE: ICD-10-CM

## 2021-09-30 ENCOUNTER — OUTPATIENT (OUTPATIENT)
Dept: OUTPATIENT SERVICES | Facility: HOSPITAL | Age: 76
LOS: 1 days | Discharge: HOME | End: 2021-09-30
Payer: MEDICARE

## 2021-09-30 DIAGNOSIS — R07.9 CHEST PAIN, UNSPECIFIED: ICD-10-CM

## 2021-09-30 PROCEDURE — 75572 CT HRT W/3D IMAGE: CPT | Mod: 26

## 2022-04-17 ENCOUNTER — EMERGENCY (EMERGENCY)
Facility: HOSPITAL | Age: 77
LOS: 0 days | Discharge: HOME | End: 2022-04-17
Attending: EMERGENCY MEDICINE | Admitting: EMERGENCY MEDICINE
Payer: MEDICARE

## 2022-04-17 ENCOUNTER — EMERGENCY (EMERGENCY)
Facility: HOSPITAL | Age: 77
LOS: 0 days | Discharge: HOME | End: 2022-04-17
Attending: STUDENT IN AN ORGANIZED HEALTH CARE EDUCATION/TRAINING PROGRAM | Admitting: STUDENT IN AN ORGANIZED HEALTH CARE EDUCATION/TRAINING PROGRAM
Payer: MEDICARE

## 2022-04-17 VITALS
HEIGHT: 70 IN | DIASTOLIC BLOOD PRESSURE: 78 MMHG | TEMPERATURE: 98 F | SYSTOLIC BLOOD PRESSURE: 126 MMHG | OXYGEN SATURATION: 99 % | WEIGHT: 210.1 LBS | HEART RATE: 110 BPM | RESPIRATION RATE: 18 BRPM

## 2022-04-17 VITALS
DIASTOLIC BLOOD PRESSURE: 70 MMHG | SYSTOLIC BLOOD PRESSURE: 186 MMHG | HEIGHT: 70 IN | TEMPERATURE: 97 F | HEART RATE: 110 BPM | RESPIRATION RATE: 20 BRPM

## 2022-04-17 VITALS
SYSTOLIC BLOOD PRESSURE: 136 MMHG | DIASTOLIC BLOOD PRESSURE: 79 MMHG | OXYGEN SATURATION: 97 % | RESPIRATION RATE: 20 BRPM | HEART RATE: 91 BPM

## 2022-04-17 DIAGNOSIS — Z85.79 PERSONAL HISTORY OF OTHER MALIGNANT NEOPLASMS OF LYMPHOID, HEMATOPOIETIC AND RELATED TISSUES: ICD-10-CM

## 2022-04-17 DIAGNOSIS — R33.9 RETENTION OF URINE, UNSPECIFIED: ICD-10-CM

## 2022-04-17 DIAGNOSIS — Z79.82 LONG TERM (CURRENT) USE OF ASPIRIN: ICD-10-CM

## 2022-04-17 DIAGNOSIS — R39.9 UNSPECIFIED SYMPTOMS AND SIGNS INVOLVING THE GENITOURINARY SYSTEM: ICD-10-CM

## 2022-04-17 DIAGNOSIS — R10.30 LOWER ABDOMINAL PAIN, UNSPECIFIED: ICD-10-CM

## 2022-04-17 LAB
APPEARANCE UR: CLEAR — SIGNIFICANT CHANGE UP
BILIRUB UR-MCNC: NEGATIVE — SIGNIFICANT CHANGE UP
COLOR SPEC: YELLOW — SIGNIFICANT CHANGE UP
DIFF PNL FLD: NEGATIVE — SIGNIFICANT CHANGE UP
GLUCOSE UR QL: NEGATIVE MG/DL — SIGNIFICANT CHANGE UP
KETONES UR-MCNC: NEGATIVE — SIGNIFICANT CHANGE UP
LEUKOCYTE ESTERASE UR-ACNC: NEGATIVE — SIGNIFICANT CHANGE UP
NITRITE UR-MCNC: NEGATIVE — SIGNIFICANT CHANGE UP
PH UR: 7 — SIGNIFICANT CHANGE UP (ref 5–8)
PROT UR-MCNC: NEGATIVE MG/DL — SIGNIFICANT CHANGE UP
SP GR SPEC: 1.01 — SIGNIFICANT CHANGE UP (ref 1.01–1.03)
UROBILINOGEN FLD QL: 0.2 MG/DL — SIGNIFICANT CHANGE UP

## 2022-04-17 PROCEDURE — 99284 EMERGENCY DEPT VISIT MOD MDM: CPT

## 2022-04-17 NOTE — ED PROVIDER NOTE - PROGRESS NOTE DETAILS
Feeling much better after urinations in ED.  Bedside sono found 200 cc in urinary bladder.  Patient was able to urinate little more in ED.  Offered patient urinary Lynn catheters patient declined.  Will wait and see at home.  Encourage return if continued not able to urinate.   Review the patient blood work from [facility], creatinine 1.3 BUN 23.

## 2022-04-17 NOTE — ED PROVIDER NOTE - NS ED ROS FT
Constitutional: no fever, chills, no recent weight loss, change in appetite or malaise  Eyes: no redness/discharge/pain/vision changes  ENT: no rhinorrhea/ear pain/sore throat  Cardiac: No chest pain, SOB or edema.  Respiratory: No cough or respiratory distress  GI: No nausea, vomiting, diarrhea or abdominal pain.  : See HPI  MS: no pain to back or extremities, no loss of ROM, no weakness  Neuro: No headache or weakness. No LOC.  Skin: No skin rash.  Endocrine: No history of thyroid disease or diabetes.

## 2022-04-17 NOTE — ED PROVIDER NOTE - NSFOLLOWUPINSTRUCTIONS_ED_ALL_ED_FT
Our Emergency Department Referral Coordinators will be reaching out ot you in the next 24-48 hours from 9:00am to 5:00pm (Monday to Friday) with a follow up appointment. Please expect a phone call from the hospital in that time frame. If you do not receive a call or if you have any questions or concerns, you can reach them at [phone] or [phone].          Urinary Retention    Urinary retention is the inability to completely empty your bladder. This is a common problem in older men, especially with enlarged prostates. If you are sent home with a lin catheter and a drainage system make sure to keep the drainage bag emptied and lower than your catheter. Keep the lin catheter in until you follow up with a urologist.    SEEK IMMEDIATE MEDICAL CARE IF YOU DEVELOP THE FOLLOWING SYMPTOMS: the catheter stops draining urine, the catheter falls out, abdominal pain, nausea/vomiting, or chills/fever.

## 2022-04-17 NOTE — ED PROVIDER NOTE - CARE PROVIDER_API CALL
Tanner Duffy)  Urology  [address] [place] [address], [address]  [place] [address]  Phone: [phone]  Fax: [phone]  Follow Up Time:

## 2022-04-17 NOTE — ED PROVIDER NOTE - NS ED ROS FT
Review of Systems    Constitutional: (-) fever/ chills   Eyes (-) visual changes  Gastrointestinal: (-) vomiting, (-) diarrhea (-) abdominal pain  : (-) dysuria , hematuria   neck: (-) neck pain or stiffness  Musculoskeletal:  (-) back pain, (-) joint pain   Integumentary: (-) rash, (-) swelling  Neurological: (-) headache, (-) altered mental status

## 2022-04-17 NOTE — ED PROVIDER NOTE - PHYSICAL EXAMINATION
Vital Signs: I have reviewed the initial vital signs.  Constitutional: well-nourished, appears stated age, no acute distress  Head: atraumatic and normocephalic  Eyes:PERRLA, EOMI,  ENT: TM b/l clear, oropharynx clear, no dental injury, MMM  Gastrointestinal: soft, non-tender abdomen, no pulsatile mass, urinary bladder distention , no cva tenderness  msk: gait steady, no pelvis tenderness  Neuro: awake, alert, follows commands, oriented, no focal deficits, GCS 15  ;

## 2022-04-17 NOTE — ED PROVIDER NOTE - ATTENDING APP SHARED VISIT CONTRIBUTION OF CARE
urinary retention, voided on arrival, still with zan residual but pt declines lin at this time, will f/u as outpatient. Patient counseled regarding conditions which should prompt return.

## 2022-04-17 NOTE — ED PROVIDER NOTE - PATIENT PORTAL LINK FT
You can access the FollowMyHealth Patient Portal offered by [facility] by registering at the following website: [email or web address]. By joining [employer]’s FollowMyHealth portal, you will also be able to view your health information using other applications (apps) compatible with our system.

## 2022-04-17 NOTE — ED PROVIDER NOTE - OBJECTIVE STATEMENT
75 y/o male with hx of multiple myeloma presents to the ED for urinary retention. patient with recent pet scan one week ago with lesions to ribs. patient denies any fever, chills. patient without any previous urology evaluation . patient with resolved constipation . patient with eval at [facility] had lab testing yesterday, creatinine 1.3 and nl urinalysis. patient seen in the ED last night overnight due to urinary retention . patient able to spontaneously urinate small amount and dc home. patient unable to urinate since that time and now c/o increased pain to lower abdomen. patient without any gross hematuria or back pain. no fevers.

## 2022-04-17 NOTE — ED PROCEDURE NOTE - ATTENDING APP SHARED VISIT CONTRIBUTION OF CARE
I personally supervised the study.  I reviewed the images and interpretation by the resident/ACP and have edited where appropriate.
Calm

## 2022-04-17 NOTE — ED PROVIDER NOTE - OBJECTIVE STATEMENT
76 years old male history of multiple myeloma present complaint of urinary hesitancy for 2 days.  Patient reports he was seen at [facility] yesterday for similar complaint and had a negative work-up.  Patient started feeling similar symptoms again tonight and not able to urinate and had a bowel movement so he came to ED for evaluation again.  Patient reported he was able to move his bowels before he left home and he was able to urinate a full stream in the ED so he is feeling much better prior to evaluations.  Patient otherwise denies fever, chills, chest pain, abdominal pain, flank pain, vomiting and diarrhea.

## 2022-04-17 NOTE — ED PROVIDER NOTE - PATIENT PORTAL LINK FT
You can access the FollowMyHealth Patient Portal offered by [facility] by registering at the following website: [email or web address]. By joining [employer]’s FollowMyHealth portal, you will also be able to view your health information using other applications (apps) compatible with our system.

## 2022-04-17 NOTE — ED PROVIDER NOTE - ATTENDING CONTRIBUTION TO CARE
76-year-old male with a past medical history significant for multiple myeloma who presents with urinary retention.  Patient denies any other medical complaints    VITAL SIGNS: I have reviewed nursing notes and confirm.  CONSTITUTIONAL: non-toxic, well appearing  SKIN: no rash, no petechiae.  EYES:  EOMI, pink conjunctiva, anicteric  ENT: tongue midline, no exudates, MMM  NECK: Supple; no meningismus, no JVD  CARD: RRR, no murmurs, equal radial pulses bilaterally 2+  RESP: CTAB, no respiratory distress  ABD: Soft, non-tender, non-distended, no peritoneal signs, no HSM, no CVA tenderness  EXT: Normal ROM x4.     a/p  76 yr old m that presents in urinary retention  -ua  -lin  -reassess  -dispo pending

## 2022-04-17 NOTE — ED PROCEDURE NOTE - NS ED ATTENDING STATEMENT MOD
This was a shared visit with the RODRIGO. I reviewed and verified the documentation and independently performed the documented:

## 2022-04-17 NOTE — ED PROCEDURE NOTE - ATTENDING SHARED VISIT SELECTORS
Medical Decision Making Complex Repair And Flap Additional Text (Will Appearing After The Standard Complex Repair Text): The complex repair was not sufficient to completely close the primary defect. The remaining additional defect was repaired with the flap mentioned below.

## 2022-04-17 NOTE — ED PROVIDER NOTE - NSFOLLOWUPINSTRUCTIONS_ED_ALL_ED_FT
Urinary Frequency, Adult    Urinary frequency means urinating more often than usual. People with urinary frequency urinate at least 8 times in 24 hours, even if they drink a normal amount of fluid. Although they urinate more often than normal, the total amount of urine produced in a day may be normal. Urinary frequency is also called pollakiuria.    What are the causes?  This condition may be caused by:  A urinary tract infection.  Obesity.  Bladder problems, such as bladder stones.  Caffeine or alcohol.  Eating food or drinking fluids that irritate the bladder. These include coffee, tea, soda, artificial sweeteners, citrus, tomato-based foods, and chocolate.  Certain medicines, such as medicines that help the body get rid of extra fluid (diuretics).  Muscle or nerve weakness.  Overactive bladder.  Chronic diabetes.  Interstitial cystitis.  In men, problems with the prostate, such as an enlarged prostate.  In women, pregnancy.  In some cases, the cause may not be known.    What increases the risk?  This condition is more likely to develop in:  Women who have gone through menopause.  Men with prostate problems.  People with a disease or injury that affects the nerves or spinal cord.  People who have or have had a condition that affects the brain, such as a stroke.  What are the signs or symptoms?  Symptoms of this condition include:  Feeling an urgent need to urinate often. The stress and anxiety of needing to find a bathroom quickly can make this urge worse.  Urinating 8 or more times in 24 hours.  Urinating as often as every 1 to 2 hours.  How is this diagnosed?  This condition is diagnosed based on your symptoms, your medical history, and a physical exam. You may have tests, such as:  Blood tests.  Urine tests.  Imaging tests, such as X-rays or ultrasounds.  A bladder test.  A test of your neurological system. This is the body system that senses the need to urinate.  A test to check for problems in the urethra and bladder called cystoscopy.  You may also be asked to keep a bladder diary. A bladder diary is a record of what you eat and drink, how often you urinate, and how much you urinate. You may need to see a health care provider who specializes in conditions of the urinary tract (urologist) or kidneys (nephrologist).    How is this treated?  Treatment for this condition depends on the cause. Sometimes the condition goes away on its own and treatment is not necessary. If treatment is needed, it may include:  Taking medicine.  Learning exercises that strengthen the muscles that help control urination.  Following a bladder training program. This may include:  Learning to delay going to the bathroom.  Double urinating (voiding). This helps if you are not completely emptying your bladder.  Scheduled voiding.  Making diet changes, such as:  Avoiding caffeine.  Drinking fewer fluids, especially alcohol.  Not drinking in the evening.  Not having foods or drinks that may irritate the bladder.  Eating foods that help prevent or ease constipation. Constipation can make this condition worse.  Having the nerves in your bladder stimulated. There are two options for stimulating the nerves to your bladder:  Outpatient electrical nerve stimulation. This is done by your health care provider.  Surgery to implant a bladder pacemaker. The pacemaker helps to control the urge to urinate.  Follow these instructions at home:  Keep a bladder diary if told to by your health care provider.  Take over-the-counter and prescription medicines only as told by your health care provider.  Do any exercises as told by your health care provider.  Follow a bladder training program as told by your health care provider.  Make any recommended diet changes.  Keep all follow-up visits as told by your health care provider. This is important.  Contact a health care provider if:  You start urinating more often.  You feel pain or irritation when you urinate.  You notice blood in your urine.  Your urine looks cloudy.  You develop a fever.  You begin vomiting.  Get help right away if:  You are unable to urinate.  This information is not intended to replace advice given to you by your health care provider. Make sure you discuss any questions you have with your health care provider.

## 2022-04-18 PROBLEM — C90.00 MULTIPLE MYELOMA NOT HAVING ACHIEVED REMISSION: Chronic | Status: ACTIVE | Noted: 2022-04-17

## 2022-04-18 LAB
CULTURE RESULTS: NO GROWTH — SIGNIFICANT CHANGE UP
SPECIMEN SOURCE: SIGNIFICANT CHANGE UP

## 2022-04-19 ENCOUNTER — APPOINTMENT (OUTPATIENT)
Dept: UROLOGY | Facility: CLINIC | Age: 77
End: 2022-04-19
Payer: MEDICARE

## 2022-04-19 PROCEDURE — 99203 OFFICE O/P NEW LOW 30 MIN: CPT

## 2022-04-21 ENCOUNTER — NON-APPOINTMENT (OUTPATIENT)
Age: 77
End: 2022-04-21

## 2022-04-26 ENCOUNTER — APPOINTMENT (OUTPATIENT)
Dept: UROLOGY | Facility: CLINIC | Age: 77
End: 2022-04-26
Payer: MEDICARE

## 2022-04-26 PROCEDURE — 99213 OFFICE O/P EST LOW 20 MIN: CPT

## 2022-05-10 ENCOUNTER — APPOINTMENT (OUTPATIENT)
Dept: UROLOGY | Facility: CLINIC | Age: 77
End: 2022-05-10
Payer: MEDICARE

## 2022-05-10 VITALS — HEIGHT: 70 IN | WEIGHT: 219 LBS | BODY MASS INDEX: 31.35 KG/M2

## 2022-05-10 PROCEDURE — 51798 US URINE CAPACITY MEASURE: CPT

## 2022-05-10 PROCEDURE — 99212 OFFICE O/P EST SF 10 MIN: CPT

## 2022-11-15 ENCOUNTER — APPOINTMENT (OUTPATIENT)
Dept: UROLOGY | Facility: CLINIC | Age: 77
End: 2022-11-15
Payer: MEDICARE

## 2022-11-15 VITALS
HEIGHT: 70 IN | WEIGHT: 220 LBS | DIASTOLIC BLOOD PRESSURE: 85 MMHG | SYSTOLIC BLOOD PRESSURE: 128 MMHG | BODY MASS INDEX: 31.5 KG/M2 | HEART RATE: 88 BPM

## 2022-11-15 DIAGNOSIS — R33.9 RETENTION OF URINE, UNSPECIFIED: ICD-10-CM

## 2022-11-15 PROCEDURE — 99213 OFFICE O/P EST LOW 20 MIN: CPT

## 2022-12-01 ENCOUNTER — NON-APPOINTMENT (OUTPATIENT)
Age: 77
End: 2022-12-01

## 2023-01-26 PROBLEM — R33.9 URINARY RETENTION: Status: ACTIVE | Noted: 2022-04-19

## 2023-09-27 ENCOUNTER — RX RENEWAL (OUTPATIENT)
Age: 78
End: 2023-09-27

## 2023-09-27 RX ORDER — SILODOSIN 8 MG/1
8 CAPSULE ORAL
Qty: 30 | Refills: 5 | Status: ACTIVE | COMMUNITY
Start: 2022-04-19 | End: 1900-01-01

## 2023-11-15 ENCOUNTER — APPOINTMENT (OUTPATIENT)
Dept: UROLOGY | Facility: CLINIC | Age: 78
End: 2023-11-15

## 2024-01-17 ENCOUNTER — APPOINTMENT (OUTPATIENT)
Dept: UROLOGY | Facility: CLINIC | Age: 79
End: 2024-01-17

## 2024-02-06 ENCOUNTER — LABORATORY RESULT (OUTPATIENT)
Age: 79
End: 2024-02-06

## 2024-03-07 ENCOUNTER — APPOINTMENT (OUTPATIENT)
Dept: UROLOGY | Facility: CLINIC | Age: 79
End: 2024-03-07
Payer: MEDICARE

## 2024-03-07 VITALS
SYSTOLIC BLOOD PRESSURE: 140 MMHG | DIASTOLIC BLOOD PRESSURE: 90 MMHG | OXYGEN SATURATION: 96 % | BODY MASS INDEX: 31.64 KG/M2 | HEIGHT: 70 IN | HEART RATE: 83 BPM | WEIGHT: 221 LBS

## 2024-03-07 DIAGNOSIS — N52.9 MALE ERECTILE DYSFUNCTION, UNSPECIFIED: ICD-10-CM

## 2024-03-07 LAB
BILIRUB UR QL STRIP: NORMAL
COLLECTION METHOD: NORMAL
GLUCOSE UR-MCNC: NORMAL
HCG UR QL: 0.2 EU/DL
HGB UR QL STRIP.AUTO: NORMAL
KETONES UR-MCNC: NORMAL
LEUKOCYTE ESTERASE UR QL STRIP: NORMAL
NITRITE UR QL STRIP: NORMAL
PH UR STRIP: 6
PROT UR STRIP-MCNC: NORMAL
SP GR UR STRIP: 1.02

## 2024-03-07 PROCEDURE — 81003 URINALYSIS AUTO W/O SCOPE: CPT | Mod: QW

## 2024-03-07 PROCEDURE — G2211 COMPLEX E/M VISIT ADD ON: CPT

## 2024-03-07 PROCEDURE — 99214 OFFICE O/P EST MOD 30 MIN: CPT | Mod: 25

## 2024-03-07 RX ORDER — SILDENAFIL 20 MG/1
20 TABLET ORAL
Qty: 30 | Refills: 4 | Status: ACTIVE | COMMUNITY
Start: 2024-03-07 | End: 1900-01-01

## 2024-04-17 RX ORDER — TADALAFIL 20 MG/1
20 TABLET ORAL
Qty: 5 | Refills: 5 | Status: ACTIVE | COMMUNITY
Start: 2022-11-15 | End: 1900-01-01

## 2024-04-23 LAB
PSA FREE FLD-MCNC: 23 %
PSA FREE SERPL-MCNC: 1.18 NG/ML
PSA SERPL-MCNC: 5.17 NG/ML

## 2024-04-24 ENCOUNTER — APPOINTMENT (OUTPATIENT)
Dept: UROLOGY | Facility: CLINIC | Age: 79
End: 2024-04-24
Payer: MEDICARE

## 2024-04-24 VITALS
OXYGEN SATURATION: 96 % | HEART RATE: 89 BPM | HEIGHT: 70 IN | BODY MASS INDEX: 31.64 KG/M2 | WEIGHT: 221 LBS | DIASTOLIC BLOOD PRESSURE: 84 MMHG | SYSTOLIC BLOOD PRESSURE: 142 MMHG

## 2024-04-24 LAB
BILIRUB UR QL STRIP: NORMAL
COLLECTION METHOD: NORMAL
GLUCOSE UR-MCNC: NORMAL
HCG UR QL: 0.2 EU/DL
HGB UR QL STRIP.AUTO: NORMAL
KETONES UR-MCNC: NORMAL
LEUKOCYTE ESTERASE UR QL STRIP: NORMAL
NITRITE UR QL STRIP: NORMAL
PH UR STRIP: 5.5
PROT UR STRIP-MCNC: NORMAL
SP GR UR STRIP: 1.02

## 2024-04-24 PROCEDURE — 99214 OFFICE O/P EST MOD 30 MIN: CPT | Mod: 25

## 2024-04-24 PROCEDURE — G2211 COMPLEX E/M VISIT ADD ON: CPT

## 2024-04-24 PROCEDURE — 81003 URINALYSIS AUTO W/O SCOPE: CPT | Mod: QW

## 2024-06-10 NOTE — DISCHARGE NOTE NURSING/CASE MANAGEMENT/SOCIAL WORK - NSDCPNPNATDISSUGG_GEN_ALL_CORE
We received an alert on the remote monitor site for your cardiac device that your home transmitter/PHONE RONA has been disconnected since  6/9.   At your earliest convenience, please check your home monitor so we can ensure your cardiac device is functioning normally.  If you are having problems or have questions, please call the toll free number on your equipment or below for assistance.     IF QUESTIONS/CONCERNS or NEED ASSISTANCE WITH THIS PROCESS PLEASE CALL [facility] STAY CONNECTED 1 [phone]       Thank you.       [facility]     No

## 2024-06-12 ENCOUNTER — APPOINTMENT (OUTPATIENT)
Dept: UROLOGY | Facility: CLINIC | Age: 79
End: 2024-06-12
Payer: MEDICARE

## 2024-06-12 VITALS
DIASTOLIC BLOOD PRESSURE: 87 MMHG | OXYGEN SATURATION: 98 % | BODY MASS INDEX: 31.64 KG/M2 | HEART RATE: 85 BPM | HEIGHT: 70 IN | WEIGHT: 221 LBS | SYSTOLIC BLOOD PRESSURE: 122 MMHG

## 2024-06-12 DIAGNOSIS — R97.20 ELEVATED PROSTATE, SPECIFIC ANTIGEN [PSA]: ICD-10-CM

## 2024-06-12 DIAGNOSIS — N13.8 BENIGN PROSTATIC HYPERPLASIA WITH LOWER URINARY TRACT SYMPMS: ICD-10-CM

## 2024-06-12 DIAGNOSIS — N40.1 BENIGN PROSTATIC HYPERPLASIA WITH LOWER URINARY TRACT SYMPMS: ICD-10-CM

## 2024-06-12 DIAGNOSIS — R39.9 UNSPECIFIED SYMPTOMS AND SIGNS INVOLVING THE GENITOURINARY SYSTEM: ICD-10-CM

## 2024-06-12 PROCEDURE — 81003 URINALYSIS AUTO W/O SCOPE: CPT | Mod: QW

## 2024-06-12 PROCEDURE — 99214 OFFICE O/P EST MOD 30 MIN: CPT | Mod: 25

## 2024-06-12 PROCEDURE — G2211 COMPLEX E/M VISIT ADD ON: CPT

## 2024-07-24 ENCOUNTER — APPOINTMENT (OUTPATIENT)
Dept: UROLOGY | Facility: CLINIC | Age: 79
End: 2024-07-24
Payer: MEDICARE

## 2024-07-24 DIAGNOSIS — R97.20 ELEVATED PROSTATE, SPECIFIC ANTIGEN [PSA]: ICD-10-CM

## 2024-07-24 DIAGNOSIS — R39.9 UNSPECIFIED SYMPTOMS AND SIGNS INVOLVING THE GENITOURINARY SYSTEM: ICD-10-CM

## 2024-07-24 DIAGNOSIS — N40.1 BENIGN PROSTATIC HYPERPLASIA WITH LOWER URINARY TRACT SYMPMS: ICD-10-CM

## 2024-07-24 DIAGNOSIS — N13.8 BENIGN PROSTATIC HYPERPLASIA WITH LOWER URINARY TRACT SYMPMS: ICD-10-CM

## 2024-07-24 PROCEDURE — G2211 COMPLEX E/M VISIT ADD ON: CPT

## 2024-07-24 PROCEDURE — 99214 OFFICE O/P EST MOD 30 MIN: CPT

## 2024-07-24 NOTE — HISTORY OF PRESENT ILLNESS
[Currently Experiencing ___] :  [unfilled] [Urinary Frequency] : urinary frequency [Nocturia] : nocturia [None] : None [FreeTextEntry1] : 78 year old male with h/o BPH and h/o urinary retention. On Xarelto, rapaflo QOD and sildenafil prn.  He did not have mpMRI . Patient had  nuclear PET scan in 07/2024 for multiple myeloma.  Patient s/p chemotherapy for multiple myeloma at [facility] s/p port insertion for T cell transplant  ( multiple myeloma)  7/2022 He had infusion of IGG, but could only have 1 treatment.   Past and present data reviewed: 06/2024 mpMRI= ND  11/28/2023 PET CT scan (F/U for multiple myeloma at [facility])=  heterogenous bone uptake narrow // possibly reactive heterogenous uptake in prostate gland // general interpretation inflammatory and reactive**** see report for details 07/2024  PET scan [[facility]] = patchy uptake in prostate gland, inflammatory  04/2024 PSA= 5.1   %free= 23 02/07/2024 PSA= 5.9 %free= 19 ***** //??  Resolving URI at the time of blood draw 11/2022 PSA= 4.3  5/2022 bladder scan-  144ml-  pt voided approx 1 1/2 hrs ago 11/15/22 - bladder scan- 31ml  04/2024 UA= SURAJ small 06/2024 UA= neg  [Urinary Retention] : no urinary retention [Straining] : no straining [Weak Stream] : no weak stream [Dysuria] : no dysuria [Hematuria - Gross] : no gross hematuria [Fever] : no fever

## 2024-07-24 NOTE — ASSESSMENT
[FreeTextEntry1] : 1. BPH maintained on rapaflo 8mg 2. ED --- on sildenafil 20 MG  3. Elevated PSA -- Unchanged  Plan: -Instructed patient to send us copy of PET scan from [facility] ==Next 1 to be performed in September 2024 -Discussed difference of MP MRI and PET scanning in regards to prostate nodules.  At this time, the patient would like to utilize his PET scan information prior to considering MP MRI prostate. -Continue PSA testing in 6 months--October 2024 -Return October 2024 -Patient due for PET scanning and [facility] September 2024 -Patient has no other additional questions

## 2024-07-24 NOTE — PHYSICAL EXAM
[General Appearance - Well Developed] : well developed [General Appearance - Well Nourished] : well nourished [Normal Appearance] : normal appearance [Well Groomed] : well groomed [Abdomen Soft] : soft [Abdomen Tenderness] : non-tender [Urethral Meatus] : meatus normal [Costovertebral Angle Tenderness] : no ~M costovertebral angle tenderness [Scrotum] : the scrotum was normal [Testes Mass (___cm)] : there were no testicular masses [Skin Color & Pigmentation] : normal skin color and pigmentation [Edema] : no peripheral edema [] : no respiratory distress [Respiration, Rhythm And Depth] : normal respiratory rhythm and effort [Exaggerated Use Of Accessory Muscles For Inspiration] : no accessory muscle use [Oriented To Time, Place, And Person] : oriented to person, place, and time [Affect] : the affect was normal [Mood] : the mood was normal [Normal Station and Gait] : the gait and station were normal for the patient's age [No Focal Deficits] : no focal deficits [No Palpable Adenopathy] : no palpable adenopathy

## 2024-10-10 ENCOUNTER — RESULT CHARGE (OUTPATIENT)
Age: 79
End: 2024-10-10

## 2024-10-10 ENCOUNTER — APPOINTMENT (OUTPATIENT)
Dept: UROLOGY | Facility: CLINIC | Age: 79
End: 2024-10-10
Payer: MEDICARE

## 2024-10-10 VITALS
HEART RATE: 95 BPM | OXYGEN SATURATION: 94 % | TEMPERATURE: 98.7 F | RESPIRATION RATE: 18 BRPM | HEIGHT: 69 IN | BODY MASS INDEX: 22.22 KG/M2 | DIASTOLIC BLOOD PRESSURE: 85 MMHG | SYSTOLIC BLOOD PRESSURE: 123 MMHG | WEIGHT: 150 LBS

## 2024-10-10 LAB
BILIRUB UR QL STRIP: NORMAL
COLLECTION METHOD: NORMAL
GLUCOSE UR-MCNC: NORMAL
HCG UR QL: 0.2 EU/DL
HGB UR QL STRIP.AUTO: NORMAL
KETONES UR-MCNC: NORMAL
LEUKOCYTE ESTERASE UR QL STRIP: NORMAL
NITRITE UR QL STRIP: NORMAL
PH UR STRIP: 6.5
PROT UR STRIP-MCNC: NORMAL
SP GR UR STRIP: 1.01

## 2024-10-10 PROCEDURE — 99213 OFFICE O/P EST LOW 20 MIN: CPT

## 2024-10-10 PROCEDURE — G2211 COMPLEX E/M VISIT ADD ON: CPT

## 2024-10-16 LAB
PSA FREE FLD-MCNC: 22 %
PSA FREE SERPL-MCNC: 1.09 NG/ML
PSA SERPL-MCNC: 4.89 NG/ML

## 2024-10-17 ENCOUNTER — OUTPATIENT (OUTPATIENT)
Dept: OUTPATIENT SERVICES | Facility: HOSPITAL | Age: 79
LOS: 1 days | End: 2024-10-17
Payer: MEDICARE

## 2024-10-17 ENCOUNTER — RESULT REVIEW (OUTPATIENT)
Age: 79
End: 2024-10-17

## 2024-10-17 DIAGNOSIS — R97.20 ELEVATED PROSTATE SPECIFIC ANTIGEN [PSA]: ICD-10-CM

## 2024-10-17 PROCEDURE — 72197 MRI PELVIS W/O & W/DYE: CPT

## 2024-10-17 PROCEDURE — A9579: CPT

## 2024-10-17 PROCEDURE — 72197 MRI PELVIS W/O & W/DYE: CPT | Mod: 26

## 2024-10-18 DIAGNOSIS — R97.20 ELEVATED PROSTATE SPECIFIC ANTIGEN [PSA]: ICD-10-CM

## 2024-11-20 NOTE — ED PROVIDER NOTE - CLINICAL SUMMARY MEDICAL DECISION MAKING FREE TEXT BOX
76 yr old m that presents in urinary retention  -ua  -lin  -pt discharged with urology/pcp follow up and strict return precautions. pt verbalized understanding and agrees with plan. present

## 2024-12-05 ENCOUNTER — APPOINTMENT (OUTPATIENT)
Dept: UROLOGY | Facility: CLINIC | Age: 79
End: 2024-12-05

## 2025-09-02 ENCOUNTER — EMERGENCY (EMERGENCY)
Facility: HOSPITAL | Age: 80
LOS: 0 days | Discharge: HOME | End: 2025-09-03
Attending: EMERGENCY MEDICINE
Payer: MEDICARE

## 2025-09-02 VITALS
WEIGHT: 212.08 LBS | DIASTOLIC BLOOD PRESSURE: 77 MMHG | RESPIRATION RATE: 20 BRPM | TEMPERATURE: 98 F | OXYGEN SATURATION: 95 % | HEART RATE: 88 BPM | SYSTOLIC BLOOD PRESSURE: 119 MMHG | HEIGHT: 70 IN

## 2025-09-02 VITALS
HEART RATE: 69 BPM | DIASTOLIC BLOOD PRESSURE: 68 MMHG | TEMPERATURE: 98 F | OXYGEN SATURATION: 98 % | SYSTOLIC BLOOD PRESSURE: 111 MMHG | RESPIRATION RATE: 20 BRPM

## 2025-09-02 DIAGNOSIS — S00.81XA ABRASION OF OTHER PART OF HEAD, INITIAL ENCOUNTER: ICD-10-CM

## 2025-09-02 DIAGNOSIS — Z23 ENCOUNTER FOR IMMUNIZATION: ICD-10-CM
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
## 2025-09-02 DIAGNOSIS — Z79.01 LONG TERM (CURRENT) USE OF ANTICOAGULANTS: ICD-10-CM

## 2025-09-02 DIAGNOSIS — S80.212A ABRASION, LEFT KNEE, INITIAL ENCOUNTER: ICD-10-CM
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
## 2025-09-02 LAB
ALBUMIN SERPL ELPH-MCNC: 3.6 G/DL — SIGNIFICANT CHANGE UP (ref 3.5–5.2)
ALP SERPL-CCNC: 80 U/L — SIGNIFICANT CHANGE UP (ref 30–115)
ALT FLD-CCNC: 8 U/L — SIGNIFICANT CHANGE UP (ref 0–41)
ANION GAP SERPL CALC-SCNC: 13 MMOL/L — SIGNIFICANT CHANGE UP (ref 7–14)
APPEARANCE UR: CLEAR — SIGNIFICANT CHANGE UP
AST SERPL-CCNC: 17 U/L — SIGNIFICANT CHANGE UP (ref 0–41)
BACTERIA # UR AUTO: ABNORMAL /HPF
BASOPHILS # BLD AUTO: 0.07 K/UL — SIGNIFICANT CHANGE UP (ref 0–0.2)
BASOPHILS NFR BLD AUTO: 0.6 % — SIGNIFICANT CHANGE UP (ref 0–2)
BILIRUB DIRECT SERPL-MCNC: 0.2 MG/DL — SIGNIFICANT CHANGE UP (ref 0–0.3)
BILIRUB INDIRECT FLD-MCNC: 0.3 MG/DL — SIGNIFICANT CHANGE UP (ref 0.2–1.2)
BILIRUB SERPL-MCNC: 0.5 MG/DL — SIGNIFICANT CHANGE UP (ref 0.2–1.2)
BILIRUB UR-MCNC: NEGATIVE — SIGNIFICANT CHANGE UP
BUN SERPL-MCNC: 20 MG/DL — SIGNIFICANT CHANGE UP (ref 10–20)
CALCIUM SERPL-MCNC: 8.4 MG/DL — SIGNIFICANT CHANGE UP (ref 8.4–10.5)
CHLORIDE SERPL-SCNC: 106 MMOL/L — SIGNIFICANT CHANGE UP (ref 98–110)
CO2 SERPL-SCNC: 24 MMOL/L — SIGNIFICANT CHANGE UP (ref 17–32)
COLOR SPEC: YELLOW — SIGNIFICANT CHANGE UP
CREAT SERPL-MCNC: 1.2 MG/DL — SIGNIFICANT CHANGE UP (ref 0.7–1.5)
DIFF PNL FLD: NEGATIVE — SIGNIFICANT CHANGE UP
EGFR: 62 ML/MIN/1.73M2 — SIGNIFICANT CHANGE UP
EGFR: 62 ML/MIN/1.73M2 — SIGNIFICANT CHANGE UP
EOSINOPHIL # BLD AUTO: 0.48 K/UL — SIGNIFICANT CHANGE UP (ref 0–0.5)
EOSINOPHIL NFR BLD AUTO: 4.4 % — SIGNIFICANT CHANGE UP (ref 0–6)
EPI CELLS # UR: SIGNIFICANT CHANGE UP
GLUCOSE SERPL-MCNC: 125 MG/DL — HIGH (ref 70–99)
GLUCOSE UR QL: NEGATIVE MG/DL — SIGNIFICANT CHANGE UP
HCT VFR BLD CALC: 39.6 % — SIGNIFICANT CHANGE UP (ref 39–50)
HGB BLD-MCNC: 13.7 G/DL — SIGNIFICANT CHANGE UP (ref 13–17)
IMM GRANULOCYTES # BLD AUTO: 0.06 K/UL — SIGNIFICANT CHANGE UP (ref 0–0.07)
IMM GRANULOCYTES NFR BLD AUTO: 0.6 % — SIGNIFICANT CHANGE UP (ref 0–0.9)
KETONES UR QL: NEGATIVE MG/DL — SIGNIFICANT CHANGE UP
LEUKOCYTE ESTERASE UR-ACNC: ABNORMAL
LIDOCAIN IGE QN: 27 U/L — SIGNIFICANT CHANGE UP (ref 7–60)
LYMPHOCYTES # BLD AUTO: 1.92 K/UL — SIGNIFICANT CHANGE UP (ref 1–3.3)
LYMPHOCYTES NFR BLD AUTO: 17.7 % — SIGNIFICANT CHANGE UP (ref 13–44)
MCHC RBC-ENTMCNC: 31.9 PG — SIGNIFICANT CHANGE UP (ref 27–34)
MCHC RBC-ENTMCNC: 34.6 G/DL — SIGNIFICANT CHANGE UP (ref 32–36)
MCV RBC AUTO: 92.3 FL — SIGNIFICANT CHANGE UP (ref 80–100)
MONOCYTES # BLD AUTO: 0.97 K/UL — HIGH (ref 0–0.9)
MONOCYTES NFR BLD AUTO: 8.9 % — SIGNIFICANT CHANGE UP (ref 2–14)
NEUTROPHILS # BLD AUTO: 7.35 K/UL — SIGNIFICANT CHANGE UP (ref 1.8–7.4)
NEUTROPHILS NFR BLD AUTO: 67.8 % — SIGNIFICANT CHANGE UP (ref 43–77)
NITRITE UR-MCNC: NEGATIVE — SIGNIFICANT CHANGE UP
NRBC # BLD AUTO: 0 K/UL — SIGNIFICANT CHANGE UP (ref 0–0)
NRBC # FLD: 0 K/UL — SIGNIFICANT CHANGE UP (ref 0–0)
NRBC BLD AUTO-RTO: 0 /100 WBCS — SIGNIFICANT CHANGE UP (ref 0–0)
PH UR: 5.5 — SIGNIFICANT CHANGE UP (ref 5–8)
PLATELET # BLD AUTO: 163 K/UL — SIGNIFICANT CHANGE UP (ref 150–400)
PMV BLD: 8.9 FL — SIGNIFICANT CHANGE UP (ref 7–13)
POTASSIUM SERPL-MCNC: 4.5 MMOL/L — SIGNIFICANT CHANGE UP (ref 3.5–5)
POTASSIUM SERPL-SCNC: 4.5 MMOL/L — SIGNIFICANT CHANGE UP (ref 3.5–5)
PROT SERPL-MCNC: 5.4 G/DL — LOW (ref 6–8)
PROT UR-MCNC: SIGNIFICANT CHANGE UP MG/DL
RBC # BLD: 4.29 M/UL — SIGNIFICANT CHANGE UP (ref 4.2–5.8)
RBC # FLD: 13.1 % — SIGNIFICANT CHANGE UP (ref 10.3–14.5)
RBC CASTS # UR COMP ASSIST: 0 /HPF — SIGNIFICANT CHANGE UP (ref 0–4)
SODIUM SERPL-SCNC: 143 MMOL/L — SIGNIFICANT CHANGE UP (ref 135–146)
SP GR SPEC: 1.02 — SIGNIFICANT CHANGE UP (ref 1–1.03)
UROBILINOGEN FLD QL: 0.2 MG/DL — SIGNIFICANT CHANGE UP (ref 0.2–1)
WBC # BLD: 10.85 K/UL — HIGH (ref 3.8–10.5)
WBC # FLD AUTO: 10.85 K/UL — HIGH (ref 3.8–10.5)
WBC UR QL: 3 /HPF — SIGNIFICANT CHANGE UP (ref 0–5)

## 2025-09-02 PROCEDURE — 99285 EMERGENCY DEPT VISIT HI MDM: CPT

## 2025-09-02 PROCEDURE — 71045 X-RAY EXAM CHEST 1 VIEW: CPT | Mod: 26

## 2025-09-02 PROCEDURE — 73562 X-RAY EXAM OF KNEE 3: CPT | Mod: LT

## 2025-09-02 PROCEDURE — 81001 URINALYSIS AUTO W/SCOPE: CPT

## 2025-09-02 PROCEDURE — 90471 IMMUNIZATION ADMIN: CPT

## 2025-09-02 PROCEDURE — 71260 CT THORAX DX C+: CPT | Mod: 26

## 2025-09-02 PROCEDURE — 70450 CT HEAD/BRAIN W/O DYE: CPT | Mod: 26

## 2025-09-02 PROCEDURE — 73562 X-RAY EXAM OF KNEE 3: CPT | Mod: 26,LT

## 2025-09-02 PROCEDURE — 80048 BASIC METABOLIC PNL TOTAL CA: CPT

## 2025-09-02 PROCEDURE — 80076 HEPATIC FUNCTION PANEL: CPT

## 2025-09-02 PROCEDURE — 36000 PLACE NEEDLE IN VEIN: CPT | Mod: XU

## 2025-09-02 PROCEDURE — 74177 CT ABD & PELVIS W/CONTRAST: CPT | Mod: 26

## 2025-09-02 PROCEDURE — 74177 CT ABD & PELVIS W/CONTRAST: CPT

## 2025-09-02 PROCEDURE — 70450 CT HEAD/BRAIN W/O DYE: CPT

## 2025-09-02 PROCEDURE — 87077 CULTURE AEROBIC IDENTIFY: CPT

## 2025-09-02 PROCEDURE — 71045 X-RAY EXAM CHEST 1 VIEW: CPT

## 2025-09-02 PROCEDURE — 36415 COLL VENOUS BLD VENIPUNCTURE: CPT

## 2025-09-02 PROCEDURE — 87086 URINE CULTURE/COLONY COUNT: CPT

## 2025-09-02 PROCEDURE — 90715 TDAP VACCINE 7 YRS/> IM: CPT

## 2025-09-02 PROCEDURE — 83690 ASSAY OF LIPASE: CPT

## 2025-09-02 PROCEDURE — 72125 CT NECK SPINE W/O DYE: CPT

## 2025-09-02 PROCEDURE — 71260 CT THORAX DX C+: CPT

## 2025-09-02 PROCEDURE — 87186 SC STD MICRODIL/AGAR DIL: CPT

## 2025-09-02 PROCEDURE — 85025 COMPLETE CBC W/AUTO DIFF WBC: CPT

## 2025-09-02 PROCEDURE — 72125 CT NECK SPINE W/O DYE: CPT | Mod: 26

## 2025-09-02 PROCEDURE — 99284 EMERGENCY DEPT VISIT MOD MDM: CPT | Mod: 25

## 2025-09-06 LAB
-  AMPICILLIN: SIGNIFICANT CHANGE UP
-  CIPROFLOXACIN: SIGNIFICANT CHANGE UP
-  LEVOFLOXACIN: SIGNIFICANT CHANGE UP
-  NITROFURANTOIN: SIGNIFICANT CHANGE UP
-  TETRACYCLINE: SIGNIFICANT CHANGE UP
-  VANCOMYCIN: SIGNIFICANT CHANGE UP
CULTURE RESULTS: ABNORMAL
METHOD TYPE: SIGNIFICANT CHANGE UP
ORGANISM # SPEC MICROSCOPIC CNT: ABNORMAL
ORGANISM # SPEC MICROSCOPIC CNT: SIGNIFICANT CHANGE UP
SPECIMEN SOURCE: SIGNIFICANT CHANGE UP

## 2025-09-08 RX ORDER — NITROFURANTOIN MACROCRYSTAL 100 MG
1 CAPSULE ORAL
Qty: 14 | Refills: 0
Start: 2025-09-08 | End: 2025-09-14